# Patient Record
Sex: FEMALE | Race: WHITE | NOT HISPANIC OR LATINO | Employment: OTHER | URBAN - METROPOLITAN AREA
[De-identification: names, ages, dates, MRNs, and addresses within clinical notes are randomized per-mention and may not be internally consistent; named-entity substitution may affect disease eponyms.]

---

## 2017-01-25 ENCOUNTER — APPOINTMENT (OUTPATIENT)
Dept: LAB | Facility: MEDICAL CENTER | Age: 82
End: 2017-01-25
Payer: MEDICARE

## 2017-01-25 ENCOUNTER — TRANSCRIBE ORDERS (OUTPATIENT)
Dept: ADMINISTRATIVE | Facility: HOSPITAL | Age: 82
End: 2017-01-25

## 2017-01-25 DIAGNOSIS — E11.9 TYPE 2 DIABETES MELLITUS WITHOUT COMPLICATIONS (HCC): ICD-10-CM

## 2017-01-25 LAB
ANION GAP SERPL CALCULATED.3IONS-SCNC: 8 MMOL/L (ref 4–13)
BASOPHILS # BLD AUTO: 0.05 THOUSANDS/ΜL (ref 0–0.1)
BASOPHILS NFR BLD AUTO: 1 % (ref 0–1)
BUN SERPL-MCNC: 16 MG/DL (ref 5–25)
CALCIUM SERPL-MCNC: 9.5 MG/DL (ref 8.3–10.1)
CHLORIDE SERPL-SCNC: 103 MMOL/L (ref 100–108)
CO2 SERPL-SCNC: 30 MMOL/L (ref 21–32)
CREAT SERPL-MCNC: 0.74 MG/DL (ref 0.6–1.3)
EOSINOPHIL # BLD AUTO: 0.12 THOUSAND/ΜL (ref 0–0.61)
EOSINOPHIL NFR BLD AUTO: 2 % (ref 0–6)
ERYTHROCYTE [DISTWIDTH] IN BLOOD BY AUTOMATED COUNT: 13.4 % (ref 11.6–15.1)
EST. AVERAGE GLUCOSE BLD GHB EST-MCNC: 200 MG/DL
GFR SERPL CREATININE-BSD FRML MDRD: >60 ML/MIN/1.73SQ M
GLUCOSE SERPL-MCNC: 226 MG/DL (ref 65–140)
HBA1C MFR BLD: 8.6 % (ref 4.2–6.3)
HCT VFR BLD AUTO: 43.8 % (ref 34.8–46.1)
HGB BLD-MCNC: 14.6 G/DL (ref 11.5–15.4)
LYMPHOCYTES # BLD AUTO: 1.71 THOUSANDS/ΜL (ref 0.6–4.47)
LYMPHOCYTES NFR BLD AUTO: 22 % (ref 14–44)
MCH RBC QN AUTO: 30.7 PG (ref 26.8–34.3)
MCHC RBC AUTO-ENTMCNC: 33.3 G/DL (ref 31.4–37.4)
MCV RBC AUTO: 92 FL (ref 82–98)
MONOCYTES # BLD AUTO: 0.79 THOUSAND/ΜL (ref 0.17–1.22)
MONOCYTES NFR BLD AUTO: 10 % (ref 4–12)
NEUTROPHILS # BLD AUTO: 5.01 THOUSANDS/ΜL (ref 1.85–7.62)
NEUTS SEG NFR BLD AUTO: 65 % (ref 43–75)
NRBC BLD AUTO-RTO: 0 /100 WBCS
PLATELET # BLD AUTO: 226 THOUSANDS/UL (ref 149–390)
PMV BLD AUTO: 11.5 FL (ref 8.9–12.7)
POTASSIUM SERPL-SCNC: 3.8 MMOL/L (ref 3.5–5.3)
RBC # BLD AUTO: 4.75 MILLION/UL (ref 3.81–5.12)
SODIUM SERPL-SCNC: 141 MMOL/L (ref 136–145)
TSH SERPL DL<=0.05 MIU/L-ACNC: 1.5 UIU/ML (ref 0.36–3.74)
WBC # BLD AUTO: 7.72 THOUSAND/UL (ref 4.31–10.16)

## 2017-01-25 PROCEDURE — 36415 COLL VENOUS BLD VENIPUNCTURE: CPT

## 2017-01-25 PROCEDURE — 84443 ASSAY THYROID STIM HORMONE: CPT

## 2017-01-25 PROCEDURE — 83036 HEMOGLOBIN GLYCOSYLATED A1C: CPT

## 2017-01-25 PROCEDURE — 80048 BASIC METABOLIC PNL TOTAL CA: CPT

## 2017-01-25 PROCEDURE — 85025 COMPLETE CBC W/AUTO DIFF WBC: CPT

## 2017-02-22 ENCOUNTER — ALLSCRIPTS OFFICE VISIT (OUTPATIENT)
Dept: OTHER | Facility: OTHER | Age: 82
End: 2017-02-22

## 2017-04-04 ENCOUNTER — ALLSCRIPTS OFFICE VISIT (OUTPATIENT)
Dept: OTHER | Facility: OTHER | Age: 82
End: 2017-04-04

## 2017-04-04 ENCOUNTER — HOSPITAL ENCOUNTER (OUTPATIENT)
Dept: RADIOLOGY | Facility: CLINIC | Age: 82
Discharge: HOME | End: 2017-04-04
Payer: MEDICARE

## 2017-04-04 DIAGNOSIS — M25.512 PAIN IN LEFT SHOULDER: ICD-10-CM

## 2017-04-04 PROCEDURE — 73030 X-RAY EXAM OF SHOULDER: CPT

## 2017-06-21 DIAGNOSIS — E11.9 TYPE 2 DIABETES MELLITUS WITHOUT COMPLICATIONS (HCC): ICD-10-CM

## 2017-06-21 DIAGNOSIS — Z79.899 OTHER LONG TERM (CURRENT) DRUG THERAPY: ICD-10-CM

## 2017-06-23 ENCOUNTER — GENERIC CONVERSION - ENCOUNTER (OUTPATIENT)
Dept: OTHER | Facility: OTHER | Age: 82
End: 2017-06-23

## 2017-07-28 ENCOUNTER — GENERIC CONVERSION - ENCOUNTER (OUTPATIENT)
Dept: OTHER | Facility: OTHER | Age: 82
End: 2017-07-28

## 2017-08-07 ENCOUNTER — APPOINTMENT (OUTPATIENT)
Dept: LAB | Facility: MEDICAL CENTER | Age: 82
End: 2017-08-07
Payer: MEDICARE

## 2017-08-07 ENCOUNTER — TRANSCRIBE ORDERS (OUTPATIENT)
Dept: ADMINISTRATIVE | Facility: HOSPITAL | Age: 82
End: 2017-08-07

## 2017-08-07 DIAGNOSIS — Z79.899 OTHER LONG TERM (CURRENT) DRUG THERAPY: ICD-10-CM

## 2017-08-07 DIAGNOSIS — E04.2 NONTOXIC MULTINODULAR GOITER: ICD-10-CM

## 2017-08-07 DIAGNOSIS — E11.9 TYPE 2 DIABETES MELLITUS WITHOUT COMPLICATIONS (HCC): ICD-10-CM

## 2017-08-07 DIAGNOSIS — E04.2 NONTOXIC MULTINODULAR GOITER: Primary | ICD-10-CM

## 2017-08-07 LAB
25(OH)D3 SERPL-MCNC: 36.8 NG/ML (ref 30–100)
ALBUMIN SERPL BCP-MCNC: 3.7 G/DL (ref 3.5–5)
ALP SERPL-CCNC: 62 U/L (ref 46–116)
ALT SERPL W P-5'-P-CCNC: 27 U/L (ref 12–78)
ANION GAP SERPL CALCULATED.3IONS-SCNC: 9 MMOL/L (ref 4–13)
AST SERPL W P-5'-P-CCNC: 16 U/L (ref 5–45)
BACTERIA UR QL AUTO: ABNORMAL /HPF
BASOPHILS # BLD AUTO: 0.04 THOUSANDS/ΜL (ref 0–0.1)
BASOPHILS NFR BLD AUTO: 1 % (ref 0–1)
BILIRUB SERPL-MCNC: 0.57 MG/DL (ref 0.2–1)
BILIRUB UR QL STRIP: NEGATIVE
BUN SERPL-MCNC: 18 MG/DL (ref 5–25)
CALCIUM SERPL-MCNC: 9.4 MG/DL (ref 8.3–10.1)
CHLORIDE SERPL-SCNC: 103 MMOL/L (ref 100–108)
CHOLEST SERPL-MCNC: 140 MG/DL (ref 50–200)
CLARITY UR: ABNORMAL
CO2 SERPL-SCNC: 27 MMOL/L (ref 21–32)
COLOR UR: YELLOW
CREAT SERPL-MCNC: 0.72 MG/DL (ref 0.6–1.3)
CREAT UR-MCNC: 51.6 MG/DL
EOSINOPHIL # BLD AUTO: 0.13 THOUSAND/ΜL (ref 0–0.61)
EOSINOPHIL NFR BLD AUTO: 2 % (ref 0–6)
ERYTHROCYTE [DISTWIDTH] IN BLOOD BY AUTOMATED COUNT: 13.1 % (ref 11.6–15.1)
EST. AVERAGE GLUCOSE BLD GHB EST-MCNC: 217 MG/DL
GFR SERPL CREATININE-BSD FRML MDRD: 75 ML/MIN/1.73SQ M
GLUCOSE P FAST SERPL-MCNC: 249 MG/DL (ref 65–99)
GLUCOSE UR STRIP-MCNC: ABNORMAL MG/DL
HBA1C MFR BLD: 9.2 % (ref 4.2–6.3)
HCT VFR BLD AUTO: 44.6 % (ref 34.8–46.1)
HDLC SERPL-MCNC: 44 MG/DL (ref 40–60)
HGB BLD-MCNC: 14.7 G/DL (ref 11.5–15.4)
HGB UR QL STRIP.AUTO: NEGATIVE
KETONES UR STRIP-MCNC: NEGATIVE MG/DL
LDLC SERPL CALC-MCNC: 56 MG/DL (ref 0–100)
LEUKOCYTE ESTERASE UR QL STRIP: NEGATIVE
LYMPHOCYTES # BLD AUTO: 1.58 THOUSANDS/ΜL (ref 0.6–4.47)
LYMPHOCYTES NFR BLD AUTO: 20 % (ref 14–44)
MCH RBC QN AUTO: 30.2 PG (ref 26.8–34.3)
MCHC RBC AUTO-ENTMCNC: 33 G/DL (ref 31.4–37.4)
MCV RBC AUTO: 92 FL (ref 82–98)
MICROALBUMIN UR-MCNC: 19.9 MG/L (ref 0–20)
MICROALBUMIN/CREAT 24H UR: 39 MG/G CREATININE (ref 0–30)
MONOCYTES # BLD AUTO: 0.67 THOUSAND/ΜL (ref 0.17–1.22)
MONOCYTES NFR BLD AUTO: 8 % (ref 4–12)
NEUTROPHILS # BLD AUTO: 5.55 THOUSANDS/ΜL (ref 1.85–7.62)
NEUTS SEG NFR BLD AUTO: 69 % (ref 43–75)
NITRITE UR QL STRIP: NEGATIVE
NON-SQ EPI CELLS URNS QL MICRO: ABNORMAL /HPF
NRBC BLD AUTO-RTO: 0 /100 WBCS
PH UR STRIP.AUTO: 6 [PH] (ref 4.5–8)
PLATELET # BLD AUTO: 203 THOUSANDS/UL (ref 149–390)
PMV BLD AUTO: 11.4 FL (ref 8.9–12.7)
POTASSIUM SERPL-SCNC: 3.6 MMOL/L (ref 3.5–5.3)
PROT SERPL-MCNC: 7.1 G/DL (ref 6.4–8.2)
PROT UR STRIP-MCNC: NEGATIVE MG/DL
RBC # BLD AUTO: 4.86 MILLION/UL (ref 3.81–5.12)
RBC #/AREA URNS AUTO: ABNORMAL /HPF
SODIUM SERPL-SCNC: 139 MMOL/L (ref 136–145)
SP GR UR STRIP.AUTO: 1.02 (ref 1–1.03)
TRIGL SERPL-MCNC: 199 MG/DL
TSH SERPL DL<=0.05 MIU/L-ACNC: 0.96 UIU/ML (ref 0.36–3.74)
UROBILINOGEN UR QL STRIP.AUTO: 0.2 E.U./DL
WBC # BLD AUTO: 8 THOUSAND/UL (ref 4.31–10.16)
WBC #/AREA URNS AUTO: ABNORMAL /HPF

## 2017-08-07 PROCEDURE — 80061 LIPID PANEL: CPT

## 2017-08-07 PROCEDURE — 36415 COLL VENOUS BLD VENIPUNCTURE: CPT

## 2017-08-07 PROCEDURE — 80053 COMPREHEN METABOLIC PANEL: CPT

## 2017-08-07 PROCEDURE — 82043 UR ALBUMIN QUANTITATIVE: CPT

## 2017-08-07 PROCEDURE — 84443 ASSAY THYROID STIM HORMONE: CPT

## 2017-08-07 PROCEDURE — 85025 COMPLETE CBC W/AUTO DIFF WBC: CPT

## 2017-08-07 PROCEDURE — 81001 URINALYSIS AUTO W/SCOPE: CPT

## 2017-08-07 PROCEDURE — 83036 HEMOGLOBIN GLYCOSYLATED A1C: CPT

## 2017-08-07 PROCEDURE — 82306 VITAMIN D 25 HYDROXY: CPT

## 2017-08-07 PROCEDURE — 82570 ASSAY OF URINE CREATININE: CPT

## 2017-08-22 ENCOUNTER — ALLSCRIPTS OFFICE VISIT (OUTPATIENT)
Dept: OTHER | Facility: OTHER | Age: 82
End: 2017-08-22

## 2017-08-22 DIAGNOSIS — E11.9 TYPE 2 DIABETES MELLITUS WITHOUT COMPLICATIONS (HCC): ICD-10-CM

## 2017-09-01 ENCOUNTER — GENERIC CONVERSION - ENCOUNTER (OUTPATIENT)
Dept: OTHER | Facility: OTHER | Age: 82
End: 2017-09-01

## 2017-10-17 ENCOUNTER — GENERIC CONVERSION - ENCOUNTER (OUTPATIENT)
Dept: OTHER | Facility: OTHER | Age: 82
End: 2017-10-17

## 2017-10-18 ENCOUNTER — GENERIC CONVERSION - ENCOUNTER (OUTPATIENT)
Dept: OTHER | Facility: OTHER | Age: 82
End: 2017-10-18

## 2017-11-07 ENCOUNTER — GENERIC CONVERSION - ENCOUNTER (OUTPATIENT)
Dept: INTERNAL MEDICINE CLINIC | Facility: CLINIC | Age: 82
End: 2017-11-07

## 2017-11-09 ENCOUNTER — ALLSCRIPTS OFFICE VISIT (OUTPATIENT)
Dept: OTHER | Facility: OTHER | Age: 82
End: 2017-11-09

## 2017-11-12 NOTE — PROGRESS NOTES
Assessment    1  Preoperative clearance (V72 84) (Z01 818)   2  Cataract of left eye (366 9) (H26 9)   3  DMII (diabetes mellitus, type 2) (250 00) (E11 9)   4  Benign essential hypertension (401 1) (I10)   5  Hyperlipidemia (272 4) (E78 5)   6  Lacunar infarction (434 91) (I63 9)    Discussion/Summary  Surgical Clearance: She is at a LOW risk from a cardiovascular standpoint at this time without any additional cardiac testing  Reevaluation needed, if she should present with symptoms prior to surgery/procedure  Surgical clearance faxed to       Patient would hold her metformin the day of surgery and would take only 10 units of Lantus rather than 40 units the night before  She will continue with Plavix as patient has history of lacunar infarct  Blood pressure is well controlled  The patient was counseled regarding instructions for management,-- risk factor reductions,-- patient and family education,-- impressions,-- risks and benefits of treatment options,-- importance of compliance with treatment  Chief Complaint  pre op      History of Present Illness  Pre-Op Visit (Brief): The patient is being seen for a preoperative visit-- and-- cataract  Surgical Risk Assessment: Exercise Capacity: unable to walk two flights of stairs without symptoms  Lifestyle Factors: denies alcohol use, denies tobacco use and denies illegal drug use  Symptoms: no frequent nosebleeds,-- no chest pain,-- no cough,-- no dyspnea,-- no edema-- and-- no palpitations  Pertinent Family History: no pertinent family history  Living Situation: no post-op concerns with her living situation  Review of Systems   Constitutional: no fever-- and-- no chills  Eyes: eyesight problems  ENT: no sore throat  Cardiovascular: no chest pain,-- no palpitations-- and-- no lower extremity edema  Respiratory: shortness of breath during exertion, but-- no cough  Gastrointestinal: no abdominal pain,-- no nausea-- and-- no diarrhea    Genitourinary: no dysuria  Endocrine: 130-140  Active Problems    1  Benign essential hypertension (401 1) (I10)   2  Benign paroxysmal positional vertigo (386 11) (H81 10)   3  DMII (diabetes mellitus, type 2) (250 00) (E11 9)   4  Fatigue (780 79) (R53 83)   5  Flu vaccine need (V04 81) (Z23)   6  Hyperlipidemia (272 4) (E78 5)   7  Hypokalemia (276 8) (E87 6)   8  Left shoulder pain (719 41) (M25 512)   9  Long term use of drug (V58 69) (Z79 899)   10  Meningioma (225 2) (D32 9)   11  Osteoporosis (733 00) (M81 0)   12  Pain in joint of left shoulder (719 41) (M25 512)   13  Vitamin D deficiency (268 9) (E55 9)    Past Medical History   · Need for prophylactic vaccination and inoculation against influenza (V04 81) (Z23)   · Need for vaccination with 13-polyvalent pneumococcal conjugate vaccine (V03 82) (Z23)   · Screening for other and unspecified genitourinary condition (V81 6) (Z13 89)   · Visit for screening mammogram (V76 12) (Z12 31)    The active problems and past medical history were reviewed and updated today  Surgical History   · History of Appendectomy   · History of Hysterectomy   · History of Hysterectomy   · History of Near-Total Thyroidectomy   · History of Tonsillectomy With Adenoidectomy   · History of Tonsillectomy With Adenoidectomy    Family History  Mother    · Family history of Diabetes  Family History    · Family history of Cancer   · Family history of Diabetes Mellitus (V18 0)   · Family history of Father  At Age 66   · Family history of Hypertension (V17 49)   · Family history of Mother  At Age [de-identified]    The family history was reviewed and updated today  Social History     · Denied: History of Alcohol Use (History)   · Never a smoker  The social history was reviewed and updated today  Current Meds   1  AmLODIPine Besylate 5 MG Oral Tablet; Take 1 tablet daily; Therapy: 94QSR8048 to (Last Rx:2017)  Requested for: 45ADA8997 Ordered   2   Eder Next Test In Vitro Strip; TEST TWICE DAILY; Therapy: 34Oee7338 to (Evaluate:08Apr2013)  Requested for: 28BXE8111; Last Rx:09Nov2012 Ordered   3  BD Insulin Syr Ultrafine II 31G X 5/16 0 5 ML Miscellaneous; USE AS DIRECTED; Therapy: 38IUF3493 to (Last Rx:26Fht6083) Ordered   4  Clopidogrel Bisulfate 75 MG Oral Tablet; Take 1 tablet daily; Therapy: 04OUT8846 to (Last Rx:89Cwj2790)  Requested for: 33Uqe7934 Ordered   5  Lantus 100 UNIT/ML Subcutaneous Solution; INJECT UNDER THE SKIN AS DIRECTED; Therapy: 87QYY4236 to (Last Rx:06Bha1921)  Requested for: 88Qru0997 Ordered   6  Losartan Potassium-HCTZ 50-12 5 MG Oral Tablet; TAKE ONE AND ONE-HALF TABLETS DAILY; Therapy: 76Ylz2091 to (Last Rx:71Yvv3732)  Requested for: 14Zal5445 Ordered   7  MetFORMIN HCl - 1000 MG Oral Tablet; take 1 tablet daily with food; Therapy: 25HQH1088 to (Last Rx:21Iyo6631)  Requested for: 00Tar6289 Ordered   8  Simvastatin 20 MG Oral Tablet; TAKE 1 TABLET AT BEDTIME; Therapy: 92XCF9426 to (Last Rx:96Lqs3877)  Requested for: 79Kgx6375 Ordered    The medication list was reviewed and updated today  Allergies  1  No Known Drug Allergies    Vitals   Recorded: 64CCU1911 09:37AM Recorded: 26IGN2154 09:35AM   Temperature 96 5 F    Heart Rate  88   Systolic  672   Diastolic  74   Height  6 ft 10 in   Weight  153 lb    BMI Calculated  16   BSA Calculated  2 09   O2 Saturation  93       Physical Exam   Eyes  Conjunctiva and lids: No swelling, erythema or discharge  Ears, Nose, Mouth, and Throat  Hearing: Normal    Oropharynx: Normal with no erythema, edema, exudate or lesions  Neck  Neck: Supple, symmetric, trachea midline, no masses  Thyroid: Normal, no thyromegaly  Pulmonary  Respiratory effort: No increased work of breathing or signs of respiratory distress  Auscultation of lungs: Clear to auscultation  Cardiovascular  Auscultation of heart: Normal rate and rhythm, normal S1 and S2, no murmurs  Carotid pulses: 2+ bilaterally     Pedal pulses: 2+ bilaterally  Examination of extremities for edema and/or varicosities: Normal    Abdomen  Abdomen: Non-tender, no masses  Liver and spleen: No hepatomegaly or splenomegaly  Neurologic  Cranial nerves: Cranial nerves II-XII intact  Cortical function: Normal mental status  Psychiatric  Judgment and insight: Normal    Orientation to person, place, and time: Normal    Recent and remote memory: Intact  Mood and affect: Normal        End of Encounter Meds    1  AmLODIPine Besylate 5 MG Oral Tablet; Take 1 tablet daily; Therapy: 57JGB0125 to (Last Rx:09Oct2017)  Requested for: 60PCF9986 Ordered   2  Losartan Potassium-HCTZ 50-12 5 MG Oral Tablet; TAKE ONE AND ONE-HALF TABLETS DAILY; Therapy: 40Vws0871 to (Last Rx:18Ltc5915)  Requested for: 89Azy5546 Ordered    3  Clopidogrel Bisulfate 75 MG Oral Tablet (Plavix); Take 1 tablet daily; Therapy: 78BAJ6062 to (Last Rx:12Rkq6691)  Requested for: 59Cpe6334 Ordered    4  Марина Contour Next Test In [employer]; TEST TWICE DAILY; Therapy: 43Vgl9441 to (Evaluate:08Apr2013)  Requested for: 35KSW5332; Last Rx:09Nov2012 Ordered   5  BD Insulin Syr Ultrafine II 31G X 5/16 0 5 ML Miscellaneous; USE AS DIRECTED; Therapy: 50RBG9672 to (Last Rx:64Jdz5884) Ordered   6  Lantus 100 UNIT/ML Subcutaneous Solution; INJECT UNDER THE SKIN AS DIRECTED; Therapy: 03WSX7412 to (Last Rx:12Zaz8078)  Requested for: 80Eur0095 Ordered   7  MetFORMIN HCl - 1000 MG Oral Tablet (Glucophage); take 1 tablet daily with food; Therapy: 88HTF0762 to (Last Rx:26Oct2017)  Requested for: 26Oct2017 Ordered    8  Simvastatin 20 MG Oral Tablet; TAKE 1 TABLET AT BEDTIME; Therapy: 38ECM2042 to (Last Rx:13Jun2017)  Requested for: 48UUY9882 Ordered    Future Appointments    Date/Time Provider Specialty Site   12/27/2017 09:00 AM GISELLA Vickers   Internal Medicine [facility]       Signatures   Electronically signed by : GISELLA De Jesus ; Nov 11 2017  9:46PM EST                       (Author)

## 2018-01-13 VITALS
HEIGHT: 72 IN | WEIGHT: 166 LBS | HEART RATE: 75 BPM | BODY MASS INDEX: 22.48 KG/M2 | OXYGEN SATURATION: 98 % | SYSTOLIC BLOOD PRESSURE: 148 MMHG | DIASTOLIC BLOOD PRESSURE: 86 MMHG

## 2018-01-13 VITALS
BODY MASS INDEX: 30.73 KG/M2 | HEIGHT: 62 IN | DIASTOLIC BLOOD PRESSURE: 79 MMHG | HEART RATE: 97 BPM | WEIGHT: 167 LBS | SYSTOLIC BLOOD PRESSURE: 164 MMHG

## 2018-01-14 VITALS — OXYGEN SATURATION: 97 % | DIASTOLIC BLOOD PRESSURE: 80 MMHG | HEART RATE: 101 BPM | SYSTOLIC BLOOD PRESSURE: 140 MMHG

## 2018-01-14 VITALS
DIASTOLIC BLOOD PRESSURE: 74 MMHG | SYSTOLIC BLOOD PRESSURE: 128 MMHG | WEIGHT: 153 LBS | HEART RATE: 88 BPM | OXYGEN SATURATION: 93 % | HEIGHT: 72 IN | TEMPERATURE: 96.5 F | BODY MASS INDEX: 20.72 KG/M2

## 2018-01-17 NOTE — PROGRESS NOTES
Assessment    1  Benign essential hypertension (401 1) (I10)   2  DMII (diabetes mellitus, type 2) (250 00) (E11 9)   3  Encounter for preventive health examination (V70 0) (Z00 00)    Discussion/Summary  Discussion Summary:   Nikki Adams is animated and telogen and in no distress her blood pressures well controlled the carotids are normal lungs are clear cardiac examination feels a regular rhythm and no murmurs or gallops extremities unremarkable is a well-healed surgical scar at the base of the neck from previous thyroid surgery  The patient stable  She will continue with Norvasc 5, losartan HCTZ 80 gas 12-1/2, Plavix 75, 35 units of Lantus, 20 of simvastatin, and 1000 and of metformin  Her last A1c was 8 5 and I think under the circumstances this is probably acceptable eared no changes are made we cost counseled her at length about her 's prognosis we'll see her in 4 months  Chief Complaint  Chief Complaint Free Text Note Form: 4 month f/u      History of Present Illness  HPI: Tasha Saha is here today for a visit  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  Her  Robert Cantu apparently collapsed at home was taken to the hospital is now in [place] his dementia appears to have progressed rapidly he is unable to swallow is febrile and probably aspirated  Arvilla Done he otherwise is holding her own he eats sometimes a moderately this is certainly defensible  She continues to see Dr Orly Blanchard her foot Dr  and also Dr Blank Seen her ophthalmologist she takes her medications correctly there no other new issues  She is no longer doing braille      Active Problems    1  Benign essential hypertension (401 1) (I10)   2  Benign paroxysmal positional vertigo (386 11) (H81 10)   3  DMII (diabetes mellitus, type 2) (250 00) (E11 9)   4  Fatigue (780 79) (R53 83)   5  Flu vaccine need (V04 81) (Z23)   6  Hyperlipidemia (272 4) (E78 5)   7  Hypokalemia (276 8) (E87 6)   8  Long term use of drug (V58 69) (Z79 899)   9   Meningioma (225 2) (D32 9)   10  Osteoporosis (733 00) (M81 0)   11  Vitamin D deficiency (268 9) (E55 9)    Past Medical History    1  Need for prophylactic vaccination and inoculation against influenza (V04 81) (Z23)   2  Visit for screening mammogram (V7 12) (Z12 31)    Surgical History    1  History of Appendectomy   2  History of Hysterectomy   3  History of Hysterectomy   4  History of Near-Total Thyroidectomy   5  History of Tonsillectomy With Adenoidectomy   6  History of Tonsillectomy With Adenoidectomy    Family History    1  Family history of Diabetes    2  Family history of Cancer   3  Family history of Diabetes Mellitus (V18 0)   4  Family history of Father  At Age 67   9  Family history of Hypertension (V17 49)   6  Family history of Mother  At Age [de-identified]    Social History    · Denied: History of Alcohol Use (History)   · Never A Smoker    Current Meds   1  AmLODIPine Besylate 5 MG Oral Tablet; Take 1 tablet daily; Therapy: 56PTP0880 to (Last Rx:2016)  Requested for: 64ULU5374 Ordered   2  Марина Contour Next Test In [employer]; TEST TWICE DAILY; Therapy: 26Qtz2772 to (Evaluate:2013)  Requested for: 14CIV4346; Last Rx:2012   Ordered   3  BD Insulin Syr Ultrafine II 31G X 5/16" 0 5 ML Miscellaneous; USE AS DIRECTED; Therapy: 84FIT4434 to (Last Rx:45Zkq8648) Ordered   4  Clopidogrel Bisulfate 75 MG Oral Tablet; Take 1 tablet daily; Therapy: 40TUA9940 to (Last Rx:2016)  Requested for: 77QGJ1957 Ordered   5  Lantus 100 UNIT/ML Subcutaneous Solution; INJECT UNDER THE SKIN AS DIRECTED; Therapy: 28GKR7299 to (Last Rx:39Opc9046)  Requested for: 95Vmw9875 Ordered   6  Losartan Potassium-HCTZ 50-12 5 MG Oral Tablet; TAKE ONE AND ONE-HALF TABLETS DAILY; Therapy: 2011 to (Last Rx:2015)  Requested for: 07KMW8569 Ordered   7  MetFORMIN HCl - 1000 MG Oral Tablet; take 1 tablet daily with food; Therapy: 30TPD9867 to (Last Rx:2016)  Requested for: 50IXK8423 Ordered   8  Simvastatin 20 MG Oral Tablet; TAKE 1 TABLET AT BEDTIME; Therapy: 96GVW5955 to (Last Rx:12Oct2015)  Requested for: 12Oct2015 Ordered    Allergies    1  No Known Drug Allergies    Vitals  Vital Signs [Data Includes: Current Encounter]    Recorded: 21Mar2016 10:36AM Recorded: 21Mar2016 10:20AM   Heart Rate  92   Pulse Quality Normal    Systolic 883    Diastolic 80    Height  5 ft 2 in   Weight  164 lb    BMI Calculated  30   BSA Calculated  1 76   O2 Saturation  98     Physical Exam    Constitutional   General appearance: No acute distress, well appearing and well nourished  Eyes   Conjunctiva and lids: No swelling, erythema or discharge  Pulmonary   Respiratory effort: No increased work of breathing or signs of respiratory distress  Auscultation of lungs: Clear to auscultation  Cardiovascular   Auscultation of heart: Normal rate and rhythm, normal S1 and S2, without murmurs  Examination of extremities for edema and/or varicosities: Normal     Carotid pulses: Normal     Skin   Skin and subcutaneous tissue: Normal without rashes or lesions      Psychiatric   Orientation to person, place, and time: Normal     Mood and affect: Normal          Signatures   Electronically signed by : ROBIN Sifuentes MD; Mar 21 2016 10:39AM EST                       (Author)

## 2018-01-23 NOTE — CONSULTS
Chief Complaint  pre op      History of Present Illness  Pre-Op Visit (Brief): The patient is being seen for a preoperative visit and cataract  Surgical Risk Assessment: Exercise Capacity: unable to walk two flights of stairs without symptoms  Lifestyle Factors: denies alcohol use, denies tobacco use and denies illegal drug use  Symptoms: no frequent nosebleeds, no chest pain, no cough, no dyspnea, no edema and no palpitations  Pertinent Family History: no pertinent family history  Living Situation: no post-op concerns with her living situation  Review of Systems    Constitutional: no fever and no chills  Eyes: eyesight problems  ENT: no sore throat  Cardiovascular: no chest pain, no palpitations and no lower extremity edema  Respiratory: shortness of breath during exertion, but no cough  Gastrointestinal: no abdominal pain, no nausea and no diarrhea  Genitourinary: no dysuria  Endocrine: 130-140  Active Problems    1  Benign essential hypertension (401 1) (I10)   2  Benign paroxysmal positional vertigo (386 11) (H81 10)   3  DMII (diabetes mellitus, type 2) (250 00) (E11 9)   4  Fatigue (780 79) (R53 83)   5  Flu vaccine need (V04 81) (Z23)   6  Hyperlipidemia (272 4) (E78 5)   7  Hypokalemia (276 8) (E87 6)   8  Left shoulder pain (719 41) (M25 512)   9  Long term use of drug (V58 69) (Z79 899)   10  Meningioma (225 2) (D32 9)   11  Osteoporosis (733 00) (M81 0)   12  Pain in joint of left shoulder (719 41) (M25 512)   13  Vitamin D deficiency (268 9) (E55 9)    Past Medical History    · Need for prophylactic vaccination and inoculation against influenza (V04 81) (Z23)   · Need for vaccination with 13-polyvalent pneumococcal conjugate vaccine (V03 82) (Z23)   · Screening for other and unspecified genitourinary condition (V81 6) (Z13 89)   · Visit for screening mammogram (V76 12) (Z12 31)    The active problems and past medical history were reviewed and updated today        Surgical History    · History of Appendectomy   · History of Hysterectomy   · History of Hysterectomy   · History of Near-Total Thyroidectomy   · History of Tonsillectomy With Adenoidectomy   · History of Tonsillectomy With Adenoidectomy    Family History    · Family history of Diabetes    · Family history of Cancer   · Family history of Diabetes Mellitus (V18 0)   · Family history of Father  At Age 66   · Family history of Hypertension (V17 49)   · Family history of Mother  At Age [de-identified]    The family history was reviewed and updated today  Social History    · Denied: History of Alcohol Use (History)   · Never a smoker  The social history was reviewed and updated today  Current Meds   1  AmLODIPine Besylate 5 MG Oral Tablet; Take 1 tablet daily; Therapy: 54NLY5045 to (Last Rx:55Qcv2934)  Requested for: 24KGT2782 Ordered   2  Марина Contour Next Test In [employer]; TEST TWICE DAILY; Therapy: 85Gmz6943 to (Evaluate:2013)  Requested for: 46HSU5150; Last   Rx:2012 Ordered   3  BD Insulin Syr Ultrafine II 31G X 5/16" 0 5 ML Miscellaneous; USE AS DIRECTED; Therapy: 72LTK2583 to (Last Rx:84Ioa4946) Ordered   4  Clopidogrel Bisulfate 75 MG Oral Tablet; Take 1 tablet daily; Therapy: 10RWA9703 to (Last Rx:70Uwa6149)  Requested for: 95Upd0887 Ordered   5  Lantus 100 UNIT/ML Subcutaneous Solution; INJECT UNDER THE SKIN AS DIRECTED; Therapy: 61WXG1045 to (Last Rx:18Hen8686)  Requested for: 51Fim2834 Ordered   6  Losartan Potassium-HCTZ 50-12 5 MG Oral Tablet; TAKE ONE AND ONE-HALF   TABLETS DAILY; Therapy: 07Emg9848 to (Last Rx:89Pea4766)  Requested for: 00Crk5149 Ordered   7  MetFORMIN HCl - 1000 MG Oral Tablet; take 1 tablet daily with food; Therapy: 11ZCZ3134 to (Last Rx:59Hvx9300)  Requested for: 2017 Ordered   8  Simvastatin 20 MG Oral Tablet; TAKE 1 TABLET AT BEDTIME;    Therapy: 10SSU2996 to (Last Rx:89Kja2395)  Requested for: 03Xwc4560 Ordered    The medication list was reviewed and updated today  Allergies    1  No Known Drug Allergies    Vitals  Signs    Temperature: 96 5 F   Heart Rate: 88  Systolic: 223  Diastolic: 74  Height: 6 ft 10 in  Weight: 153 lb   BMI Calculated: 16  BSA Calculated: 2 09  O2 Saturation: 93    Physical Exam    Eyes   Conjunctiva and lids: No swelling, erythema or discharge  Ears, Nose, Mouth, and Throat   Hearing: Normal     Oropharynx: Normal with no erythema, edema, exudate or lesions  Neck   Neck: Supple, symmetric, trachea midline, no masses  Thyroid: Normal, no thyromegaly  Pulmonary   Respiratory effort: No increased work of breathing or signs of respiratory distress  Auscultation of lungs: Clear to auscultation  Cardiovascular   Auscultation of heart: Normal rate and rhythm, normal S1 and S2, no murmurs  Carotid pulses: 2+ bilaterally  Pedal pulses: 2+ bilaterally  Examination of extremities for edema and/or varicosities: Normal     Abdomen   Abdomen: Non-tender, no masses  Liver and spleen: No hepatomegaly or splenomegaly  Neurologic   Cranial nerves: Cranial nerves II-XII intact  Cortical function: Normal mental status  Psychiatric   Judgment and insight: Normal     Orientation to person, place, and time: Normal     Recent and remote memory: Intact  Mood and affect: Normal        Assessment    1  Preoperative clearance (V72 84) (Z01 818)   2  Cataract of left eye (366 9) (H26 9)   3  DMII (diabetes mellitus, type 2) (250 00) (E11 9)   4  Benign essential hypertension (401 1) (I10)   5  Hyperlipidemia (272 4) (E78 5)   6  Lacunar infarction (434 91) (I63 9)    Discussion/Summary  Surgical Clearance: She is at a LOW risk from a cardiovascular standpoint at this time without any additional cardiac testing  Reevaluation needed, if she should present with symptoms prior to surgery/procedure   Surgical clearance faxed to       Patient would hold her metformin the day of surgery and would take only 10 units of Lantus rather than 40 units the night before  She will continue with Plavix as patient has history of lacunar infarct  Blood pressure is well controlled  The patient was counseled regarding instructions for management, risk factor reductions, patient and family education, impressions, risks and benefits of treatment options, importance of compliance with treatment  End of Encounter Meds    1  AmLODIPine Besylate 5 MG Oral Tablet; Take 1 tablet daily; Therapy: 70DRA4468 to (Last Rx:09Oct2017)  Requested for: 72FWP1936 Ordered   2  Losartan Potassium-HCTZ 50-12 5 MG Oral Tablet; TAKE ONE AND ONE-HALF   TABLETS DAILY; Therapy: 18Csp8252 to (Last Rx:62Zep1619)  Requested for: 16Xzx5117 Ordered    3  Clopidogrel Bisulfate 75 MG Oral Tablet (Plavix); Take 1 tablet daily; Therapy: 07JZU8435 to (Last Rx:80Qny2975)  Requested for: 56Rnv6912 Ordered    4  Марина Contour Next Test In [employer]; TEST TWICE DAILY; Therapy: 50Fjq5573 to (Evaluate:08Apr2013)  Requested for: 29NDA4228; Last   Rx:09Nov2012 Ordered   5  BD Insulin Syr Ultrafine II 31G X 5/16" 0 5 ML Miscellaneous; USE AS DIRECTED; Therapy: 05YQI6346 to (Last Rx:78Rzc8485) Ordered   6  Lantus 100 UNIT/ML Subcutaneous Solution; INJECT UNDER THE SKIN AS DIRECTED; Therapy: 47DMD2305 to (Last Rx:22Enq4957)  Requested for: 56Zkx9254 Ordered   7  MetFORMIN HCl - 1000 MG Oral Tablet (Glucophage); take 1 tablet daily with food; Therapy: 18KND6267 to (Last Rx:26Oct2017)  Requested for: 26Oct2017 Ordered    8  Simvastatin 20 MG Oral Tablet; TAKE 1 TABLET AT BEDTIME;    Therapy: 60FDC3170 to (Last Rx:13Jun2017)  Requested for: 07LGE2374 Ordered    Signatures   Electronically signed by : GISELLA Coe ; Nov 11 2017  9:46PM EST                       (Author)

## 2018-01-24 DIAGNOSIS — E11.8 TYPE 2 DIABETES MELLITUS WITH COMPLICATION, WITHOUT LONG-TERM CURRENT USE OF INSULIN (HCC): Primary | ICD-10-CM

## 2018-02-08 ENCOUNTER — TELEPHONE (OUTPATIENT)
Dept: INTERNAL MEDICINE CLINIC | Facility: CLINIC | Age: 83
End: 2018-02-08

## 2018-02-08 DIAGNOSIS — E11.8 TYPE 2 DIABETES MELLITUS WITH COMPLICATION, WITHOUT LONG-TERM CURRENT USE OF INSULIN (HCC): ICD-10-CM

## 2018-02-12 ENCOUNTER — TELEPHONE (OUTPATIENT)
Dept: INTERNAL MEDICINE CLINIC | Facility: CLINIC | Age: 83
End: 2018-02-12

## 2018-02-12 NOTE — TELEPHONE ENCOUNTER
Pt called stating that [facility] called and told her that she they did not receive the script for her metformin and is requesting that a 2 week supply for her be sent to [facility] on [facility] [place]  She states she only has 6 pills left  Please advise [phone]

## 2018-02-13 RX ORDER — CLOPIDOGREL BISULFATE 75 MG/1
1 TABLET ORAL DAILY
COMMUNITY
Start: 2012-02-17 | End: 2018-03-02 | Stop reason: SDUPTHER

## 2018-02-13 RX ORDER — SIMVASTATIN 20 MG
1 TABLET ORAL
COMMUNITY
Start: 2012-01-27 | End: 2018-05-30 | Stop reason: SDUPTHER

## 2018-02-13 RX ORDER — AMLODIPINE BESYLATE 5 MG/1
1 TABLET ORAL DAILY
COMMUNITY
Start: 2012-03-23 | End: 2018-04-06 | Stop reason: SDUPTHER

## 2018-02-13 RX ORDER — LOSARTAN POTASSIUM AND HYDROCHLOROTHIAZIDE 12.5; 5 MG/1; MG/1
1.5 TABLET ORAL DAILY
COMMUNITY
Start: 2011-04-21 | End: 2018-08-01 | Stop reason: SDUPTHER

## 2018-02-13 RX ORDER — BLOOD SUGAR DIAGNOSTIC
STRIP MISCELLANEOUS
COMMUNITY
Start: 2012-09-28

## 2018-02-13 RX ORDER — INSULIN GLARGINE 100 [IU]/ML
INJECTION, SOLUTION SUBCUTANEOUS
COMMUNITY
Start: 2011-11-24 | End: 2018-05-24 | Stop reason: SDUPTHER

## 2018-02-14 NOTE — TELEPHONE ENCOUNTER
Pt called stating that [facility] did receive the request for her metformin however the script is incomplete and need us to call to give the missing information so they can process it for her  She also states that she requested metformin to be called in to [facility] on [address] just enough to hold her over since she only has 2 pills left  Please advise

## 2018-02-14 NOTE — TELEPHONE ENCOUNTER
PT was rescheduled with DR DOUGHERTY for early April, please order routine  Labs prior to   Will use  Lab

## 2018-03-02 DIAGNOSIS — Z00.00 HEALTH CARE MAINTENANCE: Primary | ICD-10-CM

## 2018-03-02 RX ORDER — CLOPIDOGREL BISULFATE 75 MG/1
75 TABLET ORAL DAILY
Qty: 90 TABLET | Refills: 1 | Status: SHIPPED | OUTPATIENT
Start: 2018-03-02 | End: 2018-09-10 | Stop reason: SDUPTHER

## 2018-03-19 ENCOUNTER — TELEPHONE (OUTPATIENT)
Dept: INTERNAL MEDICINE CLINIC | Facility: CLINIC | Age: 83
End: 2018-03-19

## 2018-03-20 DIAGNOSIS — E55.9 VITAMIN D DEFICIENCY: ICD-10-CM

## 2018-03-20 DIAGNOSIS — E11.9 TYPE 2 DIABETES MELLITUS WITHOUT COMPLICATION, WITHOUT LONG-TERM CURRENT USE OF INSULIN (HCC): Primary | ICD-10-CM

## 2018-03-20 DIAGNOSIS — E78.49 OTHER HYPERLIPIDEMIA: ICD-10-CM

## 2018-03-30 ENCOUNTER — TRANSCRIBE ORDERS (OUTPATIENT)
Dept: ADMINISTRATIVE | Facility: HOSPITAL | Age: 83
End: 2018-03-30

## 2018-03-30 ENCOUNTER — LAB (OUTPATIENT)
Dept: LAB | Facility: MEDICAL CENTER | Age: 83
End: 2018-03-30
Payer: MEDICARE

## 2018-03-30 DIAGNOSIS — E78.49 OTHER HYPERLIPIDEMIA: ICD-10-CM

## 2018-03-30 DIAGNOSIS — E55.9 VITAMIN D DEFICIENCY: ICD-10-CM

## 2018-03-30 DIAGNOSIS — E11.9 TYPE 2 DIABETES MELLITUS WITHOUT COMPLICATION, WITHOUT LONG-TERM CURRENT USE OF INSULIN (HCC): ICD-10-CM

## 2018-03-30 LAB
ALBUMIN SERPL BCP-MCNC: 3.8 G/DL (ref 3.5–5)
ALP SERPL-CCNC: 54 U/L (ref 46–116)
ALT SERPL W P-5'-P-CCNC: 26 U/L (ref 12–78)
ANION GAP SERPL CALCULATED.3IONS-SCNC: 5 MMOL/L (ref 4–13)
AST SERPL W P-5'-P-CCNC: 18 U/L (ref 5–45)
BASOPHILS # BLD AUTO: 0.05 THOUSANDS/ΜL (ref 0–0.1)
BASOPHILS NFR BLD AUTO: 1 % (ref 0–1)
BILIRUB SERPL-MCNC: 0.58 MG/DL (ref 0.2–1)
BUN SERPL-MCNC: 18 MG/DL (ref 5–25)
CALCIUM SERPL-MCNC: 9.7 MG/DL (ref 8.3–10.1)
CHLORIDE SERPL-SCNC: 106 MMOL/L (ref 100–108)
CHOLEST SERPL-MCNC: 131 MG/DL (ref 50–200)
CO2 SERPL-SCNC: 29 MMOL/L (ref 21–32)
CREAT SERPL-MCNC: 0.75 MG/DL (ref 0.6–1.3)
EOSINOPHIL # BLD AUTO: 0.15 THOUSAND/ΜL (ref 0–0.61)
EOSINOPHIL NFR BLD AUTO: 2 % (ref 0–6)
ERYTHROCYTE [DISTWIDTH] IN BLOOD BY AUTOMATED COUNT: 13.6 % (ref 11.6–15.1)
EST. AVERAGE GLUCOSE BLD GHB EST-MCNC: 186 MG/DL
GFR SERPL CREATININE-BSD FRML MDRD: 71 ML/MIN/1.73SQ M
GLUCOSE P FAST SERPL-MCNC: 132 MG/DL (ref 65–99)
HBA1C MFR BLD: 8.1 % (ref 4.2–6.3)
HCT VFR BLD AUTO: 43.8 % (ref 34.8–46.1)
HDLC SERPL-MCNC: 51 MG/DL (ref 40–60)
HGB BLD-MCNC: 14.3 G/DL (ref 11.5–15.4)
LDLC SERPL CALC-MCNC: 54 MG/DL (ref 0–100)
LYMPHOCYTES # BLD AUTO: 1.57 THOUSANDS/ΜL (ref 0.6–4.47)
LYMPHOCYTES NFR BLD AUTO: 21 % (ref 14–44)
MCH RBC QN AUTO: 30.5 PG (ref 26.8–34.3)
MCHC RBC AUTO-ENTMCNC: 32.6 G/DL (ref 31.4–37.4)
MCV RBC AUTO: 93 FL (ref 82–98)
MONOCYTES # BLD AUTO: 0.72 THOUSAND/ΜL (ref 0.17–1.22)
MONOCYTES NFR BLD AUTO: 10 % (ref 4–12)
NEUTROPHILS # BLD AUTO: 5.05 THOUSANDS/ΜL (ref 1.85–7.62)
NEUTS SEG NFR BLD AUTO: 66 % (ref 43–75)
NRBC BLD AUTO-RTO: 0 /100 WBCS
PLATELET # BLD AUTO: 215 THOUSANDS/UL (ref 149–390)
PMV BLD AUTO: 11.5 FL (ref 8.9–12.7)
POTASSIUM SERPL-SCNC: 3.5 MMOL/L (ref 3.5–5.3)
PROT SERPL-MCNC: 7.2 G/DL (ref 6.4–8.2)
RBC # BLD AUTO: 4.69 MILLION/UL (ref 3.81–5.12)
SODIUM SERPL-SCNC: 140 MMOL/L (ref 136–145)
TRIGL SERPL-MCNC: 130 MG/DL
WBC # BLD AUTO: 7.55 THOUSAND/UL (ref 4.31–10.16)

## 2018-03-30 PROCEDURE — 80061 LIPID PANEL: CPT

## 2018-03-30 PROCEDURE — 83036 HEMOGLOBIN GLYCOSYLATED A1C: CPT

## 2018-03-30 PROCEDURE — 36415 COLL VENOUS BLD VENIPUNCTURE: CPT

## 2018-03-30 PROCEDURE — 85025 COMPLETE CBC W/AUTO DIFF WBC: CPT

## 2018-03-30 PROCEDURE — 80053 COMPREHEN METABOLIC PANEL: CPT

## 2018-04-06 DIAGNOSIS — I10 HYPERTENSION, UNSPECIFIED TYPE: Primary | ICD-10-CM

## 2018-04-06 RX ORDER — AMLODIPINE BESYLATE 5 MG/1
TABLET ORAL
Qty: 90 TABLET | Refills: 0 | Status: SHIPPED | OUTPATIENT
Start: 2018-04-06 | End: 2018-06-15 | Stop reason: SDUPTHER

## 2018-04-17 ENCOUNTER — OFFICE VISIT (OUTPATIENT)
Dept: INTERNAL MEDICINE CLINIC | Facility: CLINIC | Age: 83
End: 2018-04-17
Payer: MEDICARE

## 2018-04-17 VITALS
OXYGEN SATURATION: 96 % | HEIGHT: 62 IN | DIASTOLIC BLOOD PRESSURE: 82 MMHG | WEIGHT: 158.2 LBS | HEART RATE: 82 BPM | SYSTOLIC BLOOD PRESSURE: 148 MMHG | BODY MASS INDEX: 29.11 KG/M2

## 2018-04-17 DIAGNOSIS — Z79.4 TYPE 2 DIABETES MELLITUS WITH COMPLICATION, WITH LONG-TERM CURRENT USE OF INSULIN (HCC): Primary | ICD-10-CM

## 2018-04-17 DIAGNOSIS — E11.8 TYPE 2 DIABETES MELLITUS WITH COMPLICATION, WITH LONG-TERM CURRENT USE OF INSULIN (HCC): Primary | ICD-10-CM

## 2018-04-17 DIAGNOSIS — I10 BENIGN ESSENTIAL HYPERTENSION: ICD-10-CM

## 2018-04-17 DIAGNOSIS — Z00.00 MEDICARE ANNUAL WELLNESS VISIT, SUBSEQUENT: ICD-10-CM

## 2018-04-17 PROCEDURE — G0439 PPPS, SUBSEQ VISIT: HCPCS | Performed by: INTERNAL MEDICINE

## 2018-04-17 PROCEDURE — 99213 OFFICE O/P EST LOW 20 MIN: CPT | Performed by: INTERNAL MEDICINE

## 2018-04-17 NOTE — PROGRESS NOTES
Assessment/Plan:    No problem-specific Assessment & Plan notes found for this encounter  Diagnoses and all orders for this visit:    Type 2 diabetes mellitus with complication, with long-term current use of insulin ([address] )    Benign essential hypertension    Medicare annual wellness visit, subsequent        Subjective:      Patient ID: Richy Mendoza is a 80 y o  female  HPI     Rom Gerber is here today for visit she feels well   Her  John Ward is  she lives alone she is quite spry energetic has a good attitude about life is hoping that she will make a Dorla Pollen birthday in October she no longer drives she has been much more careful about her diabetes and sugars and we did some lab studies and found that her A1c is now 8 1 who previously had been 9 2  She has had a couple of sugars that dropped actually below 100 and she treats this with Candy and subsequently reduces her insulins from 40 units to 35  She has had some intermittent numbness in her hands she was very active doing Emma Rust in the past and using her answer craft and is where the carpal tunnel but does not wish any intervention she sees Dr Shaista Cabrera for nail care  She is under the care of Dr Efrain Burkitt her ophthalmologist and just had her 2nd cataract operated on  Recently she was outside doing some yard work and actually was able to rake up substantial amount of Pine cones and fill a couple of containers full of him she was able to do this comfortably in enjoyable E  The following portions of the patient's history were reviewed and updated as appropriate: past family history, past medical history and past surgical history      Review of Systems    Noncontributory    Objective:      /82 (Patient Position: Sitting, Cuff Size: Standard)   Pulse 82   Ht 5' 2" (1 575 m)   Wt 71 8 kg (158 lb 3 2 oz)   SpO2 96%   BMI 28 94 kg/m²      Mandi is a happy and animated talkative and engaging her pressure was 148/82 her carotids are quiet the lungs are clear cardiac examination reveals regular rhythm and rate no murmurs or gallops the abdomen is a bit obese extremities show no edema peripheral pulses are actually present ankle jerks are absent and she has an impressive loss of pinprick to the mid calf  The skin of the feet is intact Tinel sign is negative patient is quite stable no changes are made in her medications will see her back in 4 months I am happy that her lifestyle is seems to be improved and she has made significant progress in the management of her diabetes           Physical Exam      AWV Clinical     ISAR:   Previous hospitalizations?:  No       Once in a Lifetime Medicare Screening:   EKG performed?:  No    AAA screening performed? (if performed, please add date to Health Maintenance):  No       Medicare Screening Tests and Risk Assessment:   AAA Risk Assessment    Osteoporosis Risk Assessment     Female:  Yes   :  Yes :  No   Age over 48:  Yes Low body weight (<127lbs):  No   Tobacco use:  No Alcohol use:  No   HIV Risk Assessment        Drug and Alcohol Use:   Tobacco use    Cigarettes:  never smoker    Tobacco use duration    Tobacco Cessation Readiness    Alcohol use    Alcohol use:  never    Alcohol Treatment Readiness   Illicit Drug Use    Drug use:  never        Diet & Exercise:   Diet   What is your diet?:  Regular   How many servings a day of the following:   Exercise    Do you currently exercise?:  yes    Frequency:  daily   Minutes per month:  120 Type of exercise:  walking       Cognitive Impairment Screening:   Depression screening preformed:  Yes     PHQ-9 Depression scale score:  3   Depression screening results:  no significant symptoms   Cognitive Impairment Screening    Do you have difficulty learning or retaining new information?:  No Do you have difficulty handling new tasks?:  No   Do you have difficulty with reasoning?:  No Do you have difficulty with spatial ability and orientation?:  No   Do you have difficulty with language?:  No Do you have difficulty with behavior?:  No       Functional Ability/Level of Safety:   Hearing    Hearing difficulties:  No Bilateral:  normal   Left:  normal Right:  normal   Hearing aid:  No    Hearing Impairment Assessment    Hearing status:  No impairment   Current Activities    Status:  unlimited ADL's, unlimited IADL's, unlimited social activities, no driving   Help needed with the folllowing:    Using the phone:  No Transportation:  No   Shopping:  No Preparing Meals:  No   Doing Housework:  No Doing Laundry:  No    Managing Money:  No   ADL    Feeding:  Independant   Oral hygiene and Facial grooming:  Independant   Bathing:  Independant   Upper Body Dressing:  Independant   Lower Body Dressing:  Independant   Toileting:  Independant   Bed Mobility:  Independant   Fall Risk   Have you fallen in the last 12 months?:  No Are you unsteady on your feet?:  No    Are you taking any medications that may cause fatigue or dizziness?:  No   Do you have any chronic conditions that may contribute to a fall?:  Diabetes Do you rush to the bathroom potentially risking a fall?:  No   Injury History    Antidepressant Use:  No   Previous Fall:  No Alcohol Use:  No   Deconditioning:  Yes        Home Safety:   Home Safety Risk Factors   Unfamilar with surroundings:  No Uneven floors:  No   Stairs or handrail saftey risk:  Yes Loose rugs:  No   Household clutter:  No Poor household lighting:  No   No grab bars in bathroom:  No        Advanced Directives:   Advanced Directives    Living Will:  Yes Durable POA for healthcare:  Yes   Patient's End of Life Decisions    Reviewed with patient:  No        Urinary Incontinence:   Do you have urinary incontinence?:  No        Glaucoma:

## 2018-05-07 DIAGNOSIS — E11.8 TYPE 2 DIABETES MELLITUS WITH COMPLICATION, WITHOUT LONG-TERM CURRENT USE OF INSULIN (HCC): ICD-10-CM

## 2018-05-15 ENCOUNTER — TELEPHONE (OUTPATIENT)
Dept: INTERNAL MEDICINE CLINIC | Facility: CLINIC | Age: 83
End: 2018-05-15

## 2018-05-24 DIAGNOSIS — Z79.4 TYPE 2 DIABETES MELLITUS WITHOUT COMPLICATION, WITH LONG-TERM CURRENT USE OF INSULIN (HCC): Primary | ICD-10-CM

## 2018-05-24 DIAGNOSIS — E11.9 TYPE 2 DIABETES MELLITUS WITHOUT COMPLICATION, WITH LONG-TERM CURRENT USE OF INSULIN (HCC): Primary | ICD-10-CM

## 2018-05-24 RX ORDER — INSULIN GLARGINE 100 [IU]/ML
INJECTION, SOLUTION SUBCUTANEOUS
Qty: 60 ML | Refills: 0 | Status: SHIPPED | OUTPATIENT
Start: 2018-05-24 | End: 2018-11-28 | Stop reason: SDUPTHER

## 2018-05-30 DIAGNOSIS — E78.5 HYPERLIPIDEMIA, UNSPECIFIED HYPERLIPIDEMIA TYPE: Primary | ICD-10-CM

## 2018-05-30 RX ORDER — SIMVASTATIN 20 MG
TABLET ORAL
Qty: 90 TABLET | Refills: 1 | Status: SHIPPED | OUTPATIENT
Start: 2018-05-30 | End: 2018-11-28 | Stop reason: SDUPTHER

## 2018-06-06 ENCOUNTER — DOCUMENTATION (OUTPATIENT)
Dept: INTERNAL MEDICINE CLINIC | Facility: CLINIC | Age: 83
End: 2018-06-06

## 2018-06-15 DIAGNOSIS — I10 HYPERTENSION, UNSPECIFIED TYPE: ICD-10-CM

## 2018-06-16 RX ORDER — AMLODIPINE BESYLATE 5 MG/1
TABLET ORAL
Qty: 90 TABLET | Refills: 0 | Status: SHIPPED | OUTPATIENT
Start: 2018-06-16 | End: 2018-09-10 | Stop reason: SDUPTHER

## 2018-08-01 DIAGNOSIS — E11.8 TYPE 2 DIABETES MELLITUS WITH COMPLICATION, WITHOUT LONG-TERM CURRENT USE OF INSULIN (HCC): ICD-10-CM

## 2018-08-01 RX ORDER — LOSARTAN POTASSIUM AND HYDROCHLOROTHIAZIDE 12.5; 5 MG/1; MG/1
TABLET ORAL
Qty: 135 TABLET | Refills: 0 | Status: SHIPPED | OUTPATIENT
Start: 2018-08-01 | End: 2019-03-12 | Stop reason: SDUPTHER

## 2018-08-15 ENCOUNTER — APPOINTMENT (OUTPATIENT)
Dept: LAB | Facility: MEDICAL CENTER | Age: 83
End: 2018-08-15
Payer: MEDICARE

## 2018-08-15 DIAGNOSIS — E11.9 TYPE 2 DIABETES MELLITUS WITHOUT COMPLICATIONS (HCC): ICD-10-CM

## 2018-08-15 LAB
25(OH)D3 SERPL-MCNC: 34.8 NG/ML (ref 30–100)
ANION GAP SERPL CALCULATED.3IONS-SCNC: 7 MMOL/L (ref 4–13)
BACTERIA UR QL AUTO: ABNORMAL /HPF
BILIRUB UR QL STRIP: NEGATIVE
BUN SERPL-MCNC: 15 MG/DL (ref 5–25)
CALCIUM SERPL-MCNC: 9.6 MG/DL (ref 8.3–10.1)
CHLORIDE SERPL-SCNC: 104 MMOL/L (ref 100–108)
CLARITY UR: CLEAR
CO2 SERPL-SCNC: 30 MMOL/L (ref 21–32)
COLOR UR: YELLOW
CREAT SERPL-MCNC: 0.76 MG/DL (ref 0.6–1.3)
CREAT UR-MCNC: 82.8 MG/DL
EST. AVERAGE GLUCOSE BLD GHB EST-MCNC: 174 MG/DL
GFR SERPL CREATININE-BSD FRML MDRD: 70 ML/MIN/1.73SQ M
GLUCOSE P FAST SERPL-MCNC: 161 MG/DL (ref 65–99)
GLUCOSE UR STRIP-MCNC: NEGATIVE MG/DL
HBA1C MFR BLD: 7.7 % (ref 4.2–6.3)
HGB UR QL STRIP.AUTO: NEGATIVE
HYALINE CASTS #/AREA URNS LPF: ABNORMAL /LPF
KETONES UR STRIP-MCNC: NEGATIVE MG/DL
LEUKOCYTE ESTERASE UR QL STRIP: ABNORMAL
MICROALBUMIN UR-MCNC: 28.9 MG/L (ref 0–20)
MICROALBUMIN/CREAT 24H UR: 35 MG/G CREATININE (ref 0–30)
NITRITE UR QL STRIP: NEGATIVE
NON-SQ EPI CELLS URNS QL MICRO: ABNORMAL /HPF
PH UR STRIP.AUTO: 6 [PH] (ref 4.5–8)
POTASSIUM SERPL-SCNC: 3.5 MMOL/L (ref 3.5–5.3)
PROT UR STRIP-MCNC: NEGATIVE MG/DL
RBC #/AREA URNS AUTO: ABNORMAL /HPF
SODIUM SERPL-SCNC: 141 MMOL/L (ref 136–145)
SP GR UR STRIP.AUTO: 1.02 (ref 1–1.03)
TSH SERPL DL<=0.05 MIU/L-ACNC: 1.56 UIU/ML (ref 0.36–3.74)
UROBILINOGEN UR QL STRIP.AUTO: 0.2 E.U./DL
WBC #/AREA URNS AUTO: ABNORMAL /HPF

## 2018-08-15 PROCEDURE — 82043 UR ALBUMIN QUANTITATIVE: CPT

## 2018-08-15 PROCEDURE — 84443 ASSAY THYROID STIM HORMONE: CPT

## 2018-08-15 PROCEDURE — 82306 VITAMIN D 25 HYDROXY: CPT

## 2018-08-15 PROCEDURE — 82570 ASSAY OF URINE CREATININE: CPT

## 2018-08-15 PROCEDURE — 36415 COLL VENOUS BLD VENIPUNCTURE: CPT

## 2018-08-15 PROCEDURE — 81001 URINALYSIS AUTO W/SCOPE: CPT

## 2018-08-15 PROCEDURE — 83036 HEMOGLOBIN GLYCOSYLATED A1C: CPT

## 2018-08-15 PROCEDURE — 80048 BASIC METABOLIC PNL TOTAL CA: CPT

## 2018-08-22 ENCOUNTER — OFFICE VISIT (OUTPATIENT)
Dept: INTERNAL MEDICINE CLINIC | Facility: CLINIC | Age: 83
End: 2018-08-22
Payer: MEDICARE

## 2018-08-22 VITALS — HEIGHT: 62 IN | BODY MASS INDEX: 29.44 KG/M2 | HEART RATE: 92 BPM | WEIGHT: 160 LBS | OXYGEN SATURATION: 96 %

## 2018-08-22 DIAGNOSIS — Z79.4 TYPE 2 DIABETES MELLITUS WITH OTHER SPECIFIED COMPLICATION, WITH LONG-TERM CURRENT USE OF INSULIN (HCC): Primary | ICD-10-CM

## 2018-08-22 DIAGNOSIS — E11.69 TYPE 2 DIABETES MELLITUS WITH OTHER SPECIFIED COMPLICATION, WITH LONG-TERM CURRENT USE OF INSULIN (HCC): Primary | ICD-10-CM

## 2018-08-22 PROCEDURE — 99213 OFFICE O/P EST LOW 20 MIN: CPT | Performed by: INTERNAL MEDICINE

## 2018-08-22 NOTE — PROGRESS NOTES
Assessment/Plan:    No problem-specific Assessment & Plan notes found for this encounter  There are no diagnoses linked to this encounter  Subjective:      Patient ID: Rabia Olivarez is a 80 y o  female  HPI Saúl Mcgraw is here today for visit she feels well she will be 80years old in a few weeks  She lives alone is an is a rug at individual list   She says she spent her whole life take care of the rest of her family and this is resulted in her being quite self-sufficient and independent  She sees Dr Claudia garcia podiatrist   She sees Dr Darshan Daly list house her ophthalmologist who fears that she may lose vision in her right eye  Otherwise she feels well 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    The following portions of the patient's history were reviewed and updated as appropriate: allergies, current medications, past family history, past medical history, past social history, past surgical history and problem list     Review of Systems  noncontributory    Objective:      Pulse 92   Ht 5' 2" (1 575 m)   Wt 72 6 kg (160 lb)   SpO2 96%   BMI 29 26 kg/m²          Physical Exam  Mandi gets around with a single-point cane she is alert vivacious talkative and very sharp her blood pressure is 144/80 her pulse is in the mid 70s and is quite regular the carotids are unremarkable lungs are clear cardiac examination is unremarkable her skin is warm and dry with the bunch of abrasions on her arms from her previous yd work    There is no significant edema we reviewed her lab studies her microalbumin is minimal considering the length of time she has been diabetic and her last A1c is 7 7 patient is stable no changes are made in her medications will see her back in 4 months

## 2018-09-10 DIAGNOSIS — Z00.00 HEALTH CARE MAINTENANCE: ICD-10-CM

## 2018-09-10 DIAGNOSIS — I10 HYPERTENSION, UNSPECIFIED TYPE: ICD-10-CM

## 2018-09-10 RX ORDER — AMLODIPINE BESYLATE 5 MG/1
TABLET ORAL
Qty: 90 TABLET | Refills: 0 | Status: SHIPPED | OUTPATIENT
Start: 2018-09-10 | End: 2018-12-09 | Stop reason: SDUPTHER

## 2018-09-10 RX ORDER — CLOPIDOGREL BISULFATE 75 MG/1
75 TABLET ORAL DAILY
Qty: 90 TABLET | Refills: 1 | Status: SHIPPED | OUTPATIENT
Start: 2018-09-10 | End: 2019-04-01 | Stop reason: SDUPTHER

## 2018-09-10 NOTE — TELEPHONE ENCOUNTER
Karolyn called stating she needs a refill on her Plavix and when she called [facility] another dotors name was on it and the prescription number for it i is [phone]  Phone is [phone]

## 2018-10-15 DIAGNOSIS — E11.8 TYPE 2 DIABETES MELLITUS WITH COMPLICATION, WITHOUT LONG-TERM CURRENT USE OF INSULIN (HCC): ICD-10-CM

## 2018-11-20 LAB
LEFT EYE DIABETIC RETINOPATHY: NORMAL
RIGHT EYE DIABETIC RETINOPATHY: NORMAL
SEVERITY (EYE EXAM): NORMAL

## 2018-11-28 DIAGNOSIS — Z79.4 TYPE 2 DIABETES MELLITUS WITHOUT COMPLICATION, WITH LONG-TERM CURRENT USE OF INSULIN (HCC): ICD-10-CM

## 2018-11-28 DIAGNOSIS — E11.9 TYPE 2 DIABETES MELLITUS WITHOUT COMPLICATION, WITH LONG-TERM CURRENT USE OF INSULIN (HCC): ICD-10-CM

## 2018-11-28 DIAGNOSIS — E78.5 HYPERLIPIDEMIA, UNSPECIFIED HYPERLIPIDEMIA TYPE: ICD-10-CM

## 2018-11-28 RX ORDER — INSULIN GLARGINE 100 [IU]/ML
INJECTION, SOLUTION SUBCUTANEOUS
Qty: 60 ML | Refills: 0 | Status: SHIPPED | OUTPATIENT
Start: 2018-11-28

## 2018-11-28 RX ORDER — SIMVASTATIN 20 MG
TABLET ORAL
Qty: 90 TABLET | Refills: 1 | Status: SHIPPED | OUTPATIENT
Start: 2018-11-28

## 2018-12-09 DIAGNOSIS — I10 HYPERTENSION, UNSPECIFIED TYPE: ICD-10-CM

## 2018-12-09 RX ORDER — AMLODIPINE BESYLATE 5 MG/1
TABLET ORAL
Qty: 90 TABLET | Refills: 0 | Status: SHIPPED | OUTPATIENT
Start: 2018-12-09 | End: 2019-03-09 | Stop reason: SDUPTHER

## 2018-12-20 DIAGNOSIS — E11.9 TYPE 2 DIABETES MELLITUS WITHOUT COMPLICATION, WITHOUT LONG-TERM CURRENT USE OF INSULIN (HCC): Primary | ICD-10-CM

## 2018-12-22 ENCOUNTER — APPOINTMENT (OUTPATIENT)
Dept: LAB | Facility: MEDICAL CENTER | Age: 83
End: 2018-12-22
Payer: MEDICARE

## 2018-12-22 DIAGNOSIS — E11.9 TYPE 2 DIABETES MELLITUS WITHOUT COMPLICATION, WITHOUT LONG-TERM CURRENT USE OF INSULIN (HCC): ICD-10-CM

## 2018-12-22 LAB
ANION GAP SERPL CALCULATED.3IONS-SCNC: 6 MMOL/L (ref 4–13)
BUN SERPL-MCNC: 19 MG/DL (ref 5–25)
CALCIUM SERPL-MCNC: 9.3 MG/DL (ref 8.3–10.1)
CHLORIDE SERPL-SCNC: 107 MMOL/L (ref 100–108)
CO2 SERPL-SCNC: 28 MMOL/L (ref 21–32)
CREAT SERPL-MCNC: 0.73 MG/DL (ref 0.6–1.3)
EST. AVERAGE GLUCOSE BLD GHB EST-MCNC: 186 MG/DL
GFR SERPL CREATININE-BSD FRML MDRD: 73 ML/MIN/1.73SQ M
GLUCOSE P FAST SERPL-MCNC: 97 MG/DL (ref 65–99)
HBA1C MFR BLD: 8.1 % (ref 4.2–6.3)
POTASSIUM SERPL-SCNC: 3.8 MMOL/L (ref 3.5–5.3)
SODIUM SERPL-SCNC: 141 MMOL/L (ref 136–145)

## 2018-12-22 PROCEDURE — 80048 BASIC METABOLIC PNL TOTAL CA: CPT

## 2018-12-22 PROCEDURE — 36415 COLL VENOUS BLD VENIPUNCTURE: CPT

## 2018-12-22 PROCEDURE — 83036 HEMOGLOBIN GLYCOSYLATED A1C: CPT

## 2019-01-03 ENCOUNTER — OFFICE VISIT (OUTPATIENT)
Dept: INTERNAL MEDICINE CLINIC | Facility: CLINIC | Age: 84
End: 2019-01-03
Payer: MEDICARE

## 2019-01-03 VITALS
TEMPERATURE: 98.7 F | OXYGEN SATURATION: 99 % | WEIGHT: 158.4 LBS | HEART RATE: 83 BPM | HEIGHT: 62 IN | BODY MASS INDEX: 29.15 KG/M2

## 2019-01-03 DIAGNOSIS — E11.9 TYPE 2 DIABETES MELLITUS WITHOUT COMPLICATION, WITHOUT LONG-TERM CURRENT USE OF INSULIN (HCC): Primary | ICD-10-CM

## 2019-01-03 PROCEDURE — 99213 OFFICE O/P EST LOW 20 MIN: CPT | Performed by: INTERNAL MEDICINE

## 2019-01-03 NOTE — PROGRESS NOTES
Assessment/Plan:    No problem-specific Assessment & Plan notes found for this encounter  Problem List Items Addressed This Visit     None            Subjective:      Patient ID: Mi Umana is a 80 y o  female  HPI  HUNTER Raymond is here today for visit she feels quite well  She is a  she lives alone her son takes her food shopping once a month and a grandson may take her an additional time  She remains under the care of Dr Jose Martin rg her ophthalmologist who is concerned about her deteriorating vision she sees Dr Soto fail her podiatrist   She has had several episodes where her morning blood sugars were less than 100 occasionally 60-80 she was asymptomatic    The following portions of the patient's history were reviewed and updated as appropriate: allergies, current medications, past family history, past medical history, past social history, past surgical history and problem list     Review of Systems      Objective:      Pulse 83   Temp 98 7 °F (37 1 °C) (Tympanic)   Ht 5' 2" (1 575 m)   Wt 71 8 kg (158 lb 6 4 oz)   SpO2 99%   BMI 28 97 kg/m²          Physical Exam  she appears well in no distress she is alert feisty and regularly independent pressure is 130/80 the carotids are quiet the lungs are clear cardiac examination is unremarkable there is no significant edema the feet look okay we spent most of the time talking about her diabetic management her last A1c was 8 1  Will reduce her metformin from a 1000-500  Will also reduce her Lantus insulin from 35 units to 32 units also going to suggest that she take the Lantus in the morning    I am all of finally I will ask her to check her sugars twice a day and report the readings to us she feels quite safe incapable of remaining in the house alone at this time I also emphasized the need for an evening snack

## 2019-01-09 ENCOUNTER — TELEPHONE (OUTPATIENT)
Dept: INTERNAL MEDICINE CLINIC | Facility: CLINIC | Age: 84
End: 2019-01-09

## 2019-01-10 DIAGNOSIS — E11.8 TYPE 2 DIABETES MELLITUS WITH COMPLICATION, WITHOUT LONG-TERM CURRENT USE OF INSULIN (HCC): ICD-10-CM

## 2019-01-11 ENCOUNTER — TELEPHONE (OUTPATIENT)
Dept: INTERNAL MEDICINE CLINIC | Facility: CLINIC | Age: 84
End: 2019-01-11

## 2019-01-11 NOTE — TELEPHONE ENCOUNTER
Problem solved at this time   PT did reach [facility] and they have the battery and her son is going to pick it up

## 2019-01-11 NOTE — TELEPHONE ENCOUNTER
PT states that her glucose meter is suddenly not working  She states she has no way to check the battery life or replace the unit because she doesn't drive and doesn't want to bother anyone  I did suggest she call [facility] to see what options they have

## 2019-02-19 ENCOUNTER — TELEPHONE (OUTPATIENT)
Dept: INTERNAL MEDICINE CLINIC | Facility: CLINIC | Age: 84
End: 2019-02-19

## 2019-02-19 DIAGNOSIS — E55.9 VITAMIN D DEFICIENCY: ICD-10-CM

## 2019-02-19 DIAGNOSIS — E78.2 MIXED HYPERLIPIDEMIA: ICD-10-CM

## 2019-02-19 DIAGNOSIS — E11.9 TYPE 2 DIABETES MELLITUS WITHOUT COMPLICATION, UNSPECIFIED WHETHER LONG TERM INSULIN USE (HCC): Primary | ICD-10-CM

## 2019-02-26 ENCOUNTER — APPOINTMENT (OUTPATIENT)
Dept: LAB | Facility: MEDICAL CENTER | Age: 84
End: 2019-02-26
Payer: MEDICARE

## 2019-02-26 ENCOUNTER — TELEPHONE (OUTPATIENT)
Dept: INTERNAL MEDICINE CLINIC | Facility: CLINIC | Age: 84
End: 2019-02-26

## 2019-02-26 DIAGNOSIS — E78.2 MIXED HYPERLIPIDEMIA: ICD-10-CM

## 2019-02-26 DIAGNOSIS — E55.9 VITAMIN D DEFICIENCY: ICD-10-CM

## 2019-02-26 DIAGNOSIS — E11.9 TYPE 2 DIABETES MELLITUS WITHOUT COMPLICATION, UNSPECIFIED WHETHER LONG TERM INSULIN USE (HCC): ICD-10-CM

## 2019-02-26 LAB
25(OH)D3 SERPL-MCNC: 55 NG/ML (ref 30–100)
ALBUMIN SERPL BCP-MCNC: 3.8 G/DL (ref 3.5–5)
ALP SERPL-CCNC: 53 U/L (ref 46–116)
ALT SERPL W P-5'-P-CCNC: 25 U/L (ref 12–78)
ANION GAP SERPL CALCULATED.3IONS-SCNC: 6 MMOL/L (ref 4–13)
AST SERPL W P-5'-P-CCNC: 17 U/L (ref 5–45)
BASOPHILS # BLD AUTO: 0.08 THOUSANDS/ΜL (ref 0–0.1)
BASOPHILS NFR BLD AUTO: 1 % (ref 0–1)
BILIRUB SERPL-MCNC: 0.5 MG/DL (ref 0.2–1)
BUN SERPL-MCNC: 18 MG/DL (ref 5–25)
CALCIUM SERPL-MCNC: 9.1 MG/DL (ref 8.3–10.1)
CHLORIDE SERPL-SCNC: 105 MMOL/L (ref 100–108)
CHOLEST SERPL-MCNC: 134 MG/DL (ref 50–200)
CO2 SERPL-SCNC: 30 MMOL/L (ref 21–32)
CREAT SERPL-MCNC: 0.72 MG/DL (ref 0.6–1.3)
EOSINOPHIL # BLD AUTO: 0.15 THOUSAND/ΜL (ref 0–0.61)
EOSINOPHIL NFR BLD AUTO: 2 % (ref 0–6)
ERYTHROCYTE [DISTWIDTH] IN BLOOD BY AUTOMATED COUNT: 13.2 % (ref 11.6–15.1)
EST. AVERAGE GLUCOSE BLD GHB EST-MCNC: 192 MG/DL
GFR SERPL CREATININE-BSD FRML MDRD: 74 ML/MIN/1.73SQ M
GLUCOSE P FAST SERPL-MCNC: 182 MG/DL (ref 65–99)
HBA1C MFR BLD: 8.3 % (ref 4.2–6.3)
HCT VFR BLD AUTO: 43.2 % (ref 34.8–46.1)
HDLC SERPL-MCNC: 48 MG/DL (ref 40–60)
HGB BLD-MCNC: 13.9 G/DL (ref 11.5–15.4)
IMM GRANULOCYTES # BLD AUTO: 0.03 THOUSAND/UL (ref 0–0.2)
IMM GRANULOCYTES NFR BLD AUTO: 1 % (ref 0–2)
LDLC SERPL CALC-MCNC: 59 MG/DL (ref 0–100)
LYMPHOCYTES # BLD AUTO: 1.55 THOUSANDS/ΜL (ref 0.6–4.47)
LYMPHOCYTES NFR BLD AUTO: 23 % (ref 14–44)
MCH RBC QN AUTO: 31 PG (ref 26.8–34.3)
MCHC RBC AUTO-ENTMCNC: 32.2 G/DL (ref 31.4–37.4)
MCV RBC AUTO: 96 FL (ref 82–98)
MONOCYTES # BLD AUTO: 0.69 THOUSAND/ΜL (ref 0.17–1.22)
MONOCYTES NFR BLD AUTO: 10 % (ref 4–12)
NEUTROPHILS # BLD AUTO: 4.14 THOUSANDS/ΜL (ref 1.85–7.62)
NEUTS SEG NFR BLD AUTO: 63 % (ref 43–75)
NONHDLC SERPL-MCNC: 86 MG/DL
NRBC BLD AUTO-RTO: 0 /100 WBCS
PLATELET # BLD AUTO: 235 THOUSANDS/UL (ref 149–390)
PMV BLD AUTO: 11.2 FL (ref 8.9–12.7)
POTASSIUM SERPL-SCNC: 3.5 MMOL/L (ref 3.5–5.3)
PROT SERPL-MCNC: 6.8 G/DL (ref 6.4–8.2)
RBC # BLD AUTO: 4.49 MILLION/UL (ref 3.81–5.12)
SODIUM SERPL-SCNC: 141 MMOL/L (ref 136–145)
TRIGL SERPL-MCNC: 134 MG/DL
TSH SERPL DL<=0.05 MIU/L-ACNC: 1.36 UIU/ML (ref 0.36–3.74)
WBC # BLD AUTO: 6.64 THOUSAND/UL (ref 4.31–10.16)

## 2019-02-26 PROCEDURE — 85025 COMPLETE CBC W/AUTO DIFF WBC: CPT

## 2019-02-26 PROCEDURE — 80053 COMPREHEN METABOLIC PANEL: CPT

## 2019-02-26 PROCEDURE — 84443 ASSAY THYROID STIM HORMONE: CPT

## 2019-02-26 PROCEDURE — 83036 HEMOGLOBIN GLYCOSYLATED A1C: CPT

## 2019-02-26 PROCEDURE — 80061 LIPID PANEL: CPT

## 2019-02-26 PROCEDURE — 82306 VITAMIN D 25 HYDROXY: CPT

## 2019-02-26 PROCEDURE — 36415 COLL VENOUS BLD VENIPUNCTURE: CPT

## 2019-02-27 ENCOUNTER — APPOINTMENT (OUTPATIENT)
Dept: LAB | Facility: MEDICAL CENTER | Age: 84
End: 2019-02-27
Payer: MEDICARE

## 2019-02-27 ENCOUNTER — TRANSCRIBE ORDERS (OUTPATIENT)
Dept: ADMINISTRATIVE | Facility: HOSPITAL | Age: 84
End: 2019-02-27

## 2019-02-27 DIAGNOSIS — E78.2 MIXED HYPERLIPIDEMIA: Primary | ICD-10-CM

## 2019-02-27 LAB
BACTERIA UR QL AUTO: ABNORMAL /HPF
BILIRUB UR QL STRIP: NEGATIVE
CLARITY UR: ABNORMAL
COLOR UR: YELLOW
CREAT UR-MCNC: 70.7 MG/DL
GLUCOSE UR STRIP-MCNC: ABNORMAL MG/DL
HGB UR QL STRIP.AUTO: NEGATIVE
HYALINE CASTS #/AREA URNS LPF: ABNORMAL /LPF
KETONES UR STRIP-MCNC: NEGATIVE MG/DL
LEUKOCYTE ESTERASE UR QL STRIP: ABNORMAL
MICROALBUMIN UR-MCNC: 31.3 MG/L (ref 0–20)
MICROALBUMIN/CREAT 24H UR: 44 MG/G CREATININE (ref 0–30)
NITRITE UR QL STRIP: NEGATIVE
NON-SQ EPI CELLS URNS QL MICRO: ABNORMAL /HPF
PH UR STRIP.AUTO: 7.5 [PH] (ref 4.5–8)
PROT UR STRIP-MCNC: NEGATIVE MG/DL
RBC #/AREA URNS AUTO: ABNORMAL /HPF
SP GR UR STRIP.AUTO: 1.02 (ref 1–1.03)
UROBILINOGEN UR QL STRIP.AUTO: 0.2 E.U./DL
WBC #/AREA URNS AUTO: ABNORMAL /HPF

## 2019-02-27 PROCEDURE — 81001 URINALYSIS AUTO W/SCOPE: CPT

## 2019-02-27 PROCEDURE — 82043 UR ALBUMIN QUANTITATIVE: CPT

## 2019-02-27 PROCEDURE — 82570 ASSAY OF URINE CREATININE: CPT

## 2019-02-27 PROCEDURE — 87086 URINE CULTURE/COLONY COUNT: CPT

## 2019-02-28 LAB — BACTERIA UR CULT: NORMAL

## 2019-03-09 DIAGNOSIS — I10 HYPERTENSION, UNSPECIFIED TYPE: ICD-10-CM

## 2019-03-09 RX ORDER — AMLODIPINE BESYLATE 5 MG/1
TABLET ORAL
Qty: 90 TABLET | Refills: 0 | Status: SHIPPED | OUTPATIENT
Start: 2019-03-09

## 2019-03-12 DIAGNOSIS — E11.8 TYPE 2 DIABETES MELLITUS WITH COMPLICATION, WITHOUT LONG-TERM CURRENT USE OF INSULIN (HCC): ICD-10-CM

## 2019-03-12 RX ORDER — LOSARTAN POTASSIUM AND HYDROCHLOROTHIAZIDE 12.5; 5 MG/1; MG/1
TABLET ORAL
Qty: 135 TABLET | Refills: 0 | Status: SHIPPED | OUTPATIENT
Start: 2019-03-12

## 2019-03-18 ENCOUNTER — OFFICE VISIT (OUTPATIENT)
Dept: INTERNAL MEDICINE CLINIC | Facility: CLINIC | Age: 84
End: 2019-03-18
Payer: MEDICARE

## 2019-03-18 VITALS
HEART RATE: 94 BPM | DIASTOLIC BLOOD PRESSURE: 70 MMHG | OXYGEN SATURATION: 96 % | WEIGHT: 154.6 LBS | HEIGHT: 62 IN | TEMPERATURE: 98.4 F | BODY MASS INDEX: 28.45 KG/M2 | SYSTOLIC BLOOD PRESSURE: 130 MMHG

## 2019-03-18 DIAGNOSIS — E11.8 TYPE 2 DIABETES MELLITUS WITH COMPLICATION, WITH LONG-TERM CURRENT USE OF INSULIN (HCC): Primary | ICD-10-CM

## 2019-03-18 DIAGNOSIS — Z79.4 TYPE 2 DIABETES MELLITUS WITH COMPLICATION, WITH LONG-TERM CURRENT USE OF INSULIN (HCC): Primary | ICD-10-CM

## 2019-03-18 DIAGNOSIS — D32.9 MENINGIOMA (HCC): ICD-10-CM

## 2019-03-18 PROCEDURE — 99214 OFFICE O/P EST MOD 30 MIN: CPT | Performed by: INTERNAL MEDICINE

## 2019-03-18 NOTE — PROGRESS NOTES
Assessment/Plan:    No problem-specific Assessment & Plan notes found for this encounter  There are no diagnoses linked to this encounter  Subjective:      Patient ID: Paul Tirado is a 80 y o  female  HAWK Bensonores is here today for visit she feels well she lives alone and actually enjoys she does get a little only she no longer drives and 2 years ago she gave up doing rale she continues under the care of Dr Tasha rg her ophthalmologist and also Dr Yuli Smart her podiatrist   She checks her sugars on a regular basis in the fasting the usually 120-125 during her previous visit we reduced her Lantus from 35-32 and also reduced her metformin  She really has no complaints at this point she continues to take Plavix she was hospitalized elsewhere in approximately 2011 for neurologic event and has been on Plavix ever since at this point she is neurologically unremarkable is had no recurrence of any symptoms    The following portions of the patient's history were reviewed and updated as appropriate: allergies, current medications, past family history, past medical history, past social history, past surgical history and problem list     Review of Systems  noncontributory    Objective:      /70   Pulse 94   Temp 98 4 °F (36 9 °C) (Tympanic)   Ht 5' 2" (1 575 m)   Wt 70 1 kg (154 lb 9 6 oz)   SpO2 96%   BMI 28 28 kg/m²          Physical Exam  she appears well in no distress her pressure is 130/70 the carotids are quiet the lungs are clear cardiac examination reveals a a strong heartbeat regular rhythm no murmurs or gallops extremities show no edema    Patient is quite stable no changes are made in her medication she is not due for any further lab studies at this time I did caution her that we do not want her sugars to be too low she is known small meningiomas that have been followed for many years and at this point she is not interested in any further intervention or imaging and she may want to consider reducing her insulin if her sugars get much lower than they are now will see her back in 4 months and wish her well over the winter months  BMI Counseling: Body mass index is 28 28 kg/m²  Discussed the patient's BMI with her  The BMI is above average  BMI counseling and education was provided to the patient  Nutrition recommendations include reducing portion sizes

## 2019-04-01 DIAGNOSIS — Z00.00 HEALTH CARE MAINTENANCE: ICD-10-CM

## 2019-04-01 RX ORDER — CLOPIDOGREL BISULFATE 75 MG/1
TABLET ORAL
Qty: 90 TABLET | Refills: 1 | Status: SHIPPED | OUTPATIENT
Start: 2019-04-01

## 2019-06-03 ENCOUNTER — TELEPHONE (OUTPATIENT)
Dept: INTERNAL MEDICINE CLINIC | Facility: CLINIC | Age: 84
End: 2019-06-03

## 2019-07-25 NOTE — TELEPHONE ENCOUNTER
Orders are in the chart 
PT requests order placed in Epic for usual lab test prior to her 3/5 appt  She will use [facility]  Please confirm w   PT
Airway patent

## 2021-04-16 ENCOUNTER — HOSPITAL ENCOUNTER (EMERGENCY)
Facility: HOSPITAL | Age: 86
DRG: 085 | End: 2021-04-16
Payer: MEDICARE

## 2021-04-16 ENCOUNTER — APPOINTMENT (EMERGENCY)
Dept: CT IMAGING | Facility: HOSPITAL | Age: 86
DRG: 085 | End: 2021-04-16
Payer: MEDICARE

## 2021-04-16 ENCOUNTER — HOSPITAL ENCOUNTER (INPATIENT)
Facility: HOSPITAL | Age: 86
LOS: 2 days | Discharge: NURSING, REHAB OR LONG-TERM CARE | DRG: 085 | End: 2021-04-19
Attending: EMERGENCY MEDICINE | Admitting: SURGERY
Payer: MEDICARE

## 2021-04-16 VITALS
OXYGEN SATURATION: 98 % | SYSTOLIC BLOOD PRESSURE: 150 MMHG | DIASTOLIC BLOOD PRESSURE: 67 MMHG | BODY MASS INDEX: 25.28 KG/M2 | HEART RATE: 70 BPM | RESPIRATION RATE: 16 BRPM | TEMPERATURE: 97.7 F | WEIGHT: 138.23 LBS

## 2021-04-16 DIAGNOSIS — S01.01XA SCALP LACERATION, INITIAL ENCOUNTER: ICD-10-CM

## 2021-04-16 DIAGNOSIS — S09.90XA ACUTE HEAD INJURY WITHOUT LOSS OF CONSCIOUSNESS, INITIAL ENCOUNTER: ICD-10-CM

## 2021-04-16 DIAGNOSIS — S09.90XA CLOSED HEAD INJURY, INITIAL ENCOUNTER: Primary | ICD-10-CM

## 2021-04-16 DIAGNOSIS — N39.0 UTI (URINARY TRACT INFECTION): ICD-10-CM

## 2021-04-16 DIAGNOSIS — E04.1 THYROID NODULE: ICD-10-CM

## 2021-04-16 DIAGNOSIS — E87.6 HYPOKALEMIA: ICD-10-CM

## 2021-04-16 DIAGNOSIS — S00.03XA CONTUSION OF SCALP, INITIAL ENCOUNTER: ICD-10-CM

## 2021-04-16 DIAGNOSIS — D32.9 MENINGIOMA (HCC): ICD-10-CM

## 2021-04-16 DIAGNOSIS — I62.9 INTRACRANIAL HEMORRHAGE (HCC): Primary | ICD-10-CM
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
## 2021-04-16 LAB
ANION GAP SERPL CALCULATED.3IONS-SCNC: 6 MMOL/L (ref 4–13)
ATRIAL RATE: 208 BPM
ATRIAL RATE: 84 BPM
BACTERIA UR QL AUTO: ABNORMAL /HPF
BASOPHILS # BLD AUTO: 0.06 THOUSANDS/ΜL (ref 0–0.1)
BASOPHILS NFR BLD AUTO: 1 % (ref 0–1)
BILIRUB UR QL STRIP: NEGATIVE
BUN SERPL-MCNC: 18 MG/DL (ref 5–25)
CALCIUM SERPL-MCNC: 9.2 MG/DL (ref 8.3–10.1)
CHLORIDE SERPL-SCNC: 106 MMOL/L (ref 100–108)
CLARITY UR: ABNORMAL
CO2 SERPL-SCNC: 30 MMOL/L (ref 21–32)
COLOR UR: YELLOW
CREAT SERPL-MCNC: 0.59 MG/DL (ref 0.6–1.3)
EOSINOPHIL # BLD AUTO: 0.21 THOUSAND/ΜL (ref 0–0.61)
EOSINOPHIL NFR BLD AUTO: 3 % (ref 0–6)
ERYTHROCYTE [DISTWIDTH] IN BLOOD BY AUTOMATED COUNT: 12.7 % (ref 11.6–15.1)
FLUAV RNA RESP QL NAA+PROBE: NEGATIVE
FLUBV RNA RESP QL NAA+PROBE: NEGATIVE
GFR SERPL CREATININE-BSD FRML MDRD: 80 ML/MIN/1.73SQ M
GLUCOSE SERPL-MCNC: 109 MG/DL (ref 65–140)
GLUCOSE SERPL-MCNC: 117 MG/DL (ref 65–140)
GLUCOSE SERPL-MCNC: 128 MG/DL (ref 65–140)
GLUCOSE UR STRIP-MCNC: NEGATIVE MG/DL
HCT VFR BLD AUTO: 38.7 % (ref 34.8–46.1)
HGB BLD-MCNC: 12.6 G/DL (ref 11.5–15.4)
HGB UR QL STRIP.AUTO: ABNORMAL
IMM GRANULOCYTES # BLD AUTO: 0.02 THOUSAND/UL (ref 0–0.2)
IMM GRANULOCYTES NFR BLD AUTO: 0 % (ref 0–2)
KETONES UR STRIP-MCNC: NEGATIVE MG/DL
LEUKOCYTE ESTERASE UR QL STRIP: ABNORMAL
LYMPHOCYTES # BLD AUTO: 1.19 THOUSANDS/ΜL (ref 0.6–4.47)
LYMPHOCYTES NFR BLD AUTO: 17 % (ref 14–44)
MCH RBC QN AUTO: 31 PG (ref 26.8–34.3)
MCHC RBC AUTO-ENTMCNC: 32.6 G/DL (ref 31.4–37.4)
MCV RBC AUTO: 95 FL (ref 82–98)
MONOCYTES # BLD AUTO: 0.69 THOUSAND/ΜL (ref 0.17–1.22)
MONOCYTES NFR BLD AUTO: 10 % (ref 4–12)
NEUTROPHILS # BLD AUTO: 4.98 THOUSANDS/ΜL (ref 1.85–7.62)
NEUTS SEG NFR BLD AUTO: 69 % (ref 43–75)
NITRITE UR QL STRIP: POSITIVE
NON-SQ EPI CELLS URNS QL MICRO: ABNORMAL /HPF
NRBC BLD AUTO-RTO: 0 /100 WBCS
P AXIS: 70 DEGREES
PH UR STRIP.AUTO: 5.5 [PH] (ref 4.5–8)
PLATELET # BLD AUTO: 233 THOUSANDS/UL (ref 149–390)
PMV BLD AUTO: 10.2 FL (ref 8.9–12.7)
POTASSIUM SERPL-SCNC: 3.2 MMOL/L (ref 3.5–5.3)
PR INTERVAL: 248 MS
PROT UR STRIP-MCNC: NEGATIVE MG/DL
QRS AXIS: -4 DEGREES
QRS AXIS: 9 DEGREES
QRSD INTERVAL: 66 MS
QRSD INTERVAL: 70 MS
QT INTERVAL: 364 MS
QT INTERVAL: 398 MS
QTC INTERVAL: 420 MS
QTC INTERVAL: 430 MS
RBC # BLD AUTO: 4.06 MILLION/UL (ref 3.81–5.12)
RBC #/AREA URNS AUTO: ABNORMAL /HPF
RSV RNA RESP QL NAA+PROBE: NEGATIVE
SARS-COV-2 RNA RESP QL NAA+PROBE: NEGATIVE
SODIUM SERPL-SCNC: 142 MMOL/L (ref 136–145)
SP GR UR STRIP.AUTO: 1.02 (ref 1–1.03)
T WAVE AXIS: -18 DEGREES
T WAVE AXIS: 13 DEGREES
TROPONIN I SERPL-MCNC: <0.02 NG/ML
TROPONIN I SERPL-MCNC: <0.02 NG/ML
UROBILINOGEN UR QL STRIP.AUTO: 0.2 E.U./DL
VENTRICULAR RATE: 67 BPM
VENTRICULAR RATE: 84 BPM
WBC # BLD AUTO: 7.15 THOUSAND/UL (ref 4.31–10.16)
WBC #/AREA URNS AUTO: ABNORMAL /HPF

## 2021-04-16 PROCEDURE — 93005 ELECTROCARDIOGRAM TRACING: CPT

## 2021-04-16 PROCEDURE — 99220 PR INITIAL OBSERVATION CARE/DAY 70 MINUTES: CPT | Performed by: EMERGENCY MEDICINE

## 2021-04-16 PROCEDURE — 82948 REAGENT STRIP/BLOOD GLUCOSE: CPT

## 2021-04-16 PROCEDURE — 70450 CT HEAD/BRAIN W/O DYE: CPT

## 2021-04-16 PROCEDURE — 99284 EMERGENCY DEPT VISIT MOD MDM: CPT

## 2021-04-16 PROCEDURE — 99291 CRITICAL CARE FIRST HOUR: CPT | Performed by: PHYSICIAN ASSISTANT

## 2021-04-16 PROCEDURE — 0241U HB NFCT DS VIR RESP RNA 4 TRGT: CPT | Performed by: PHYSICIAN ASSISTANT

## 2021-04-16 PROCEDURE — 81001 URINALYSIS AUTO W/SCOPE: CPT

## 2021-04-16 PROCEDURE — 96365 THER/PROPH/DIAG IV INF INIT: CPT

## 2021-04-16 PROCEDURE — 80048 BASIC METABOLIC PNL TOTAL CA: CPT | Performed by: PHYSICIAN ASSISTANT

## 2021-04-16 PROCEDURE — 72125 CT NECK SPINE W/O DYE: CPT

## 2021-04-16 PROCEDURE — 96375 TX/PRO/DX INJ NEW DRUG ADDON: CPT

## 2021-04-16 PROCEDURE — 85025 COMPLETE CBC W/AUTO DIFF WBC: CPT | Performed by: PHYSICIAN ASSISTANT

## 2021-04-16 PROCEDURE — 36415 COLL VENOUS BLD VENIPUNCTURE: CPT | Performed by: PHYSICIAN ASSISTANT

## 2021-04-16 PROCEDURE — 99285 EMERGENCY DEPT VISIT HI MDM: CPT

## 2021-04-16 PROCEDURE — NC001 PR NO CHARGE: Performed by: PHYSICIAN ASSISTANT

## 2021-04-16 PROCEDURE — 84484 ASSAY OF TROPONIN QUANT: CPT | Performed by: PHYSICIAN ASSISTANT

## 2021-04-16 PROCEDURE — 93010 ELECTROCARDIOGRAM REPORT: CPT | Performed by: INTERNAL MEDICINE

## 2021-04-16 RX ORDER — AMLODIPINE BESYLATE 5 MG/1
5 TABLET ORAL DAILY
Status: DISCONTINUED | OUTPATIENT
Start: 2021-04-17 | End: 2021-04-19 | Stop reason: HOSPADM

## 2021-04-16 RX ORDER — ONDANSETRON 2 MG/ML
4 INJECTION INTRAMUSCULAR; INTRAVENOUS ONCE
Status: COMPLETED | OUTPATIENT
Start: 2021-04-16 | End: 2021-04-16

## 2021-04-16 RX ORDER — OXYCODONE HYDROCHLORIDE 5 MG/1
5 TABLET ORAL EVERY 4 HOURS PRN
Status: DISCONTINUED | OUTPATIENT
Start: 2021-04-16 | End: 2021-04-19 | Stop reason: HOSPADM

## 2021-04-16 RX ORDER — POTASSIUM CHLORIDE 20 MEQ/1
40 TABLET, EXTENDED RELEASE ORAL ONCE
Status: COMPLETED | OUTPATIENT
Start: 2021-04-16 | End: 2021-04-16

## 2021-04-16 RX ORDER — INSULIN GLARGINE 100 [IU]/ML
9 INJECTION, SOLUTION SUBCUTANEOUS
Status: DISCONTINUED | OUTPATIENT
Start: 2021-04-16 | End: 2021-04-19 | Stop reason: HOSPADM

## 2021-04-16 RX ORDER — ONDANSETRON 2 MG/ML
4 INJECTION INTRAMUSCULAR; INTRAVENOUS EVERY 6 HOURS PRN
Status: DISCONTINUED | OUTPATIENT
Start: 2021-04-16 | End: 2021-04-19 | Stop reason: HOSPADM

## 2021-04-16 RX ORDER — OXYCODONE HYDROCHLORIDE 5 MG/1
2.5 TABLET ORAL EVERY 4 HOURS PRN
Status: DISCONTINUED | OUTPATIENT
Start: 2021-04-16 | End: 2021-04-19 | Stop reason: HOSPADM

## 2021-04-16 RX ORDER — PRAVASTATIN SODIUM 40 MG
40 TABLET ORAL
Status: DISCONTINUED | OUTPATIENT
Start: 2021-04-16 | End: 2021-04-19 | Stop reason: HOSPADM

## 2021-04-16 RX ORDER — CEPHALEXIN 500 MG/1
500 CAPSULE ORAL 2 TIMES DAILY
Qty: 14 CAPSULE | Refills: 0 | Status: SHIPPED | OUTPATIENT
Start: 2021-04-16 | End: 2021-04-23

## 2021-04-16 RX ORDER — LEVETIRACETAM 500 MG/1
500 TABLET ORAL EVERY 12 HOURS SCHEDULED
Status: DISCONTINUED | OUTPATIENT
Start: 2021-04-16 | End: 2021-04-19 | Stop reason: HOSPADM

## 2021-04-16 RX ORDER — ACETAMINOPHEN 325 MG/1
975 TABLET ORAL EVERY 8 HOURS SCHEDULED
Status: DISCONTINUED | OUTPATIENT
Start: 2021-04-16 | End: 2021-04-19 | Stop reason: HOSPADM

## 2021-04-16 RX ORDER — SENNOSIDES 8.6 MG
2 TABLET ORAL DAILY
Status: DISCONTINUED | OUTPATIENT
Start: 2021-04-17 | End: 2021-04-17

## 2021-04-16 RX ADMIN — INSULIN GLARGINE 9 UNITS: 100 INJECTION, SOLUTION SUBCUTANEOUS at 23:00

## 2021-04-16 RX ADMIN — POTASSIUM CHLORIDE 40 MEQ: 1500 TABLET, EXTENDED RELEASE ORAL at 20:59

## 2021-04-16 RX ADMIN — PRAVASTATIN SODIUM 40 MG: 40 TABLET ORAL at 21:00

## 2021-04-16 RX ADMIN — POTASSIUM CHLORIDE 40 MEQ: 1500 TABLET, EXTENDED RELEASE ORAL at 10:28

## 2021-04-16 RX ADMIN — LEVETIRACETAM 500 MG: 500 TABLET, FILM COATED ORAL at 20:59

## 2021-04-16 RX ADMIN — DESMOPRESSIN ACETATE 18.8 MCG: 4 SOLUTION INTRAVENOUS at 14:49

## 2021-04-16 RX ADMIN — ONDANSETRON 4 MG: 2 INJECTION INTRAMUSCULAR; INTRAVENOUS at 12:20

## 2021-04-16 NOTE — ASSESSMENT & PLAN NOTE
Lab Results   Component Value Date    HGBA1C 7 6 (H) 11/27/2019     - Initiate subcutaneous insulin regimen     - Resume home medication therapy on discharge  - Outpatient follow-up with PCP

## 2021-04-16 NOTE — H&P
Alejandro 10/27/1928, 80 y o  female MRN: [id number]  Unit/Bed#: ED 14 Encounter: [id number]  Primary Care Provider: No primary care provider on file  Date and time admitted to hospital: 4/16/2021  3:43 PM    Fall  Assessment & Plan  - Status post fall with the below noted injuries  - Fall precautions  - Geriatric Medicine consultation for evaluation, medication review and recommendations   - PT and OT evaluation and treatment as indicated  - Case Management consultation for disposition planning  Closed head injury  Assessment & Plan  - Closed head injury with small extra-axial injury concerning for and traumatic brain injury/bleed following fall  - Admit to trauma service with HOT protocol   - Neurosurgery evaluation and recommendations appreciated  - Hold anti-platelet and anticoagulant medications for least 2 weeks and/or until cleared by Neurosurgery  Patient was given DDAVP prior to transfer  - Continue Keppra for 7 days for seizure prophylaxis  - May continue Meclizine for dizziness  - Monitor neurologic exam   - Continue symptomatic management with analgesia as needed  - PT and OT evaluation and treatment as indicated  - Outpatient Neurosurgery follow-up in 2 weeks with repeat CT scan of the head prior to follow-up appointment  Scalp laceration, initial encounter  Assessment & Plan  - Left posterior scalp laceration not requiring intervention     - Local wound care as indicated  - Analgesia as needed  - Update tetanus vaccine  Meningioma [facility])  Assessment & Plan  - Chronic meningiomas again identified on CT scans prior to transfer today  - Patient follows with Neurosurgery as an outpatient  Await Neurosurgery evaluation and recommendations  Benign essential hypertension  Assessment & Plan  - Continue home medication regimen     - Outpatient follow-up with PCP      DMII (diabetes mellitus, type 2) ([facility])  Assessment & Plan    Lab Results   Component Value Date    HGBA1C 7 6 (H) 11/27/2019     - Initiate subcutaneous insulin regimen     - Resume home medication therapy on discharge  - Outpatient follow-up with PCP  Hypokalemia  Assessment & Plan  - Hypokalemia, present on admission     - Replete as indicated  - Repeat BMP on 04/17/2021     - Outpatient follow-up with PCP  H&P Exam - Trauma   Mi Umana 80 y o  female MRN: [id number]  Unit/Bed#: ED 14 Encounter: [id number]    Assessment/Plan   Trauma Alert: Evaluation; transfer from [address] [place] [address] Extension: Ambulance  Trauma Team: Attending Dr Haskel Bamberger and Palmer Steward PA-C  Consultants: Neurosurgery: I spoke with Marsha Kamara PA-C  Time Called I spoke with Marsha Kamara in person at 17:00, Returned call: Yes We spoke in person    Trauma Active Problems and Trauma Plan: See above  Chief Complaint:  "I feel fine "    History of Present Illness   HPI:  Mi Umana is a 80 y o  female who presents As a transfer from [facility]  She initially presented there following a fall this morning during which she struck her head  She denied losing consciousness  She does admit to taking Plavix daily  Her only complaint on presentation initially was a headache from a cut she obtained on the back of her head  Her initial workup was negative for acute traumatic injuries aside from her scalp laceration  Prior to discharge from the emergency department at [facility], the patient developed some dizziness and vomited prompting further workup and a repeat CT scan of the head revealing concern for traumatic brain injury prompting her transfer to [facility]  At this time, she states she feels well other than just a minimal headache and discomfort at the site of her left scalp laceration  She offers no other complaints at this time    She notes that she has eaten since her episode of emesis earlier today with no further nausea or dizziness  Mechanism:Fall    Review of Systems   Constitutional: Negative  Negative for activity change, appetite change, chills, diaphoresis, fatigue and fever  HENT: Negative  Negative for congestion, ear pain, facial swelling and sore throat  Eyes: Negative  Negative for photophobia, pain and visual disturbance  Respiratory: Negative  Negative for cough, chest tightness, shortness of breath and wheezing  Cardiovascular: Negative  Negative for chest pain and leg swelling  Gastrointestinal: Negative  Negative for abdominal distention, abdominal pain, constipation, diarrhea, nausea and vomiting  Endocrine: Negative  Genitourinary: Negative  Negative for difficulty urinating, dysuria, flank pain, frequency and hematuria  Musculoskeletal: Negative  Negative for arthralgias, back pain, myalgias and neck pain  Skin: Positive for wound (Posterior scalp laceration)  Negative for color change, pallor and rash  Allergic/Immunologic: Negative  Neurological: Positive for headaches  Negative for dizziness, syncope, weakness and light-headedness  Hematological: Negative  Psychiatric/Behavioral: Negative  Negative for agitation, confusion, hallucinations, self-injury and sleep disturbance  The patient is not nervous/anxious  All other systems reviewed and are negative  12-point, complete review of systems was reviewed and negative except as stated above         Historical Information   Efforts to obtain history included the following sources: family member, other medical personnel, obtained from other records    Past Medical History:   Diagnosis Date    Cognitive decline     Diabetes mellitus ([facility] [address] )     Dyslipidemia     History of CVA (cerebrovascular accident)     Hypertension     Meningioma ([address] )      Past Surgical History:   Procedure Laterality Date    APPENDECTOMY      HYSTERECTOMY      HYSTERECTOMY      THYROIDECTOMY      NEAR-TOTAL    TONSILLECTOMY AND ADENOIDECTOMY      TONSILLECTOMY AND ADENOIDECTOMY       Social History   Social History     Substance and Sexual Activity   Alcohol Use No    Comment: (HISTORY)     Social History     Substance and Sexual Activity   Drug Use No     Social History     Tobacco Use   Smoking Status Never Smoker   Smokeless Tobacco Never Used     E-Cigarette/Vaping    E-Cigarette Use Never User      E-Cigarette/Vaping Substances     Immunization History   Administered Date(s) Administered    INFLUENZA 02/22/2017, 12/23/2017, 09/13/2018    Influenza Split High Dose Preservative Free IM 09/28/2012, 10/31/2013, 10/27/2014, 11/30/2015, 02/22/2017, 12/23/2017    Influenza, seasonal, injectable 09/01/1999, 10/11/2000, 10/24/2001, 10/10/2003, 11/15/2004, 12/13/2005, 11/01/2006, 10/01/2007, 10/01/2008, 09/01/2010, 08/01/2011, 10/31/2013    Pneumococcal Conjugate 13-Valent 08/01/2016    Pneumococcal Polysaccharide PPV23 10/01/1999, 10/01/1999    influenza, trivalent, adjuvanted 09/13/2018     Last Tetanus: unknown  Family History: Non-contributory  Unable to obtain/limited by N/A      Meds/Allergies   all current active meds have been reviewed and current meds:   Current Facility-Administered Medications   Medication Dose Route Frequency    acetaminophen (TYLENOL) tablet 975 mg  975 mg Oral Q8H [address]    [START ON 4/17/2021] amLODIPine (NORVASC) tablet 5 mg  5 mg Oral Daily    insulin glargine (LANTUS) subcutaneous injection 9 Units 0 09 mL  9 Units Subcutaneous HS    insulin lispro (HumaLOG) 100 units/mL subcutaneous injection 1-5 Units  1-5 Units Subcutaneous HS    insulin lispro (HumaLOG) 100 units/mL subcutaneous injection 1-5 Units  1-5 Units Subcutaneous TID AC    [START ON 4/17/2021] insulin lispro (HumaLOG) 100 units/mL subcutaneous injection 3 Units  3 Units Subcutaneous Daily With Breakfast    [START ON 4/17/2021] insulin lispro (HumaLOG) 100 units/mL subcutaneous injection 3 Units  3 Units Subcutaneous Daily With Lunch  insulin lispro (HumaLOG) 100 units/mL subcutaneous injection 3 Units  3 Units Subcutaneous Daily With Dinner    levETIRAcetam (KEPPRA) tablet 500 mg  500 mg Oral Q12H [facility]    [START ON 4/17/2021] losartan potassium-hydrochlorothiazide (HYZAAR 50/12  5) combination   Oral Daily    naloxone (NARCAN) 0 04 mg/mL syringe 0 04 mg  0 04 mg Intravenous Q1MIN PRN    ondansetron (ZOFRAN) injection 4 mg  4 mg Intravenous Q6H PRN    oxyCODONE (ROXICODONE) IR tablet 2 5 mg  2 5 mg Oral Q4H PRN    oxyCODONE (ROXICODONE) IR tablet 5 mg  5 mg Oral Q4H PRN    pravastatin (PRAVACHOL) tablet 40 mg  40 mg Oral Daily With Dinner    [START ON 4/17/2021] senna (SENOKOT) tablet 17 2 mg  2 tablet Oral Daily       Allergies   Allergen Reactions    Aspartame - Food Allergy Other (See Comments)     diarrhea         PHYSICAL EXAM    PE limited by: N/A    Objective   Vitals:   First set: Temperature: 98 8 °F (37 1 °C) (04/16/21 1553)  Pulse: 89 (04/16/21 1553)  Respirations: 18 (04/16/21 1553)  Blood Pressure: 144/83 (04/16/21 1553)    Primary Survey:   (A) Airway:  Patent and intact  (B) Breathing:  Clear and equal bilaterally  (C) Circulation: Pulses:   normal, carotid  2/4, pedal  2/4, radial  2/4 and femoral  2/4  (D) Disabliity:  GCS Total:  15, Eye Opening:   Spontaneous = 4, Motor Response: Obeys commands = 6 and Verbal Response:  Oriented = 5  (E) Expose:  Completed    Secondary Survey: (Click on Physical Exam tab above)  Physical Exam  Vitals signs and nursing note reviewed  Constitutional:       General: She is awake  She is not in acute distress  Appearance: Normal appearance  She is not ill-appearing, toxic-appearing or diaphoretic  HENT:      Head: Normocephalic  Laceration (Left posterior scalp laceration with associated small hematoma and mild tenderness) present  No abrasion          Right Ear: Hearing, tympanic membrane, ear canal and external ear normal       Left Ear: Hearing, tympanic membrane, ear canal and external ear normal       Nose: Nose normal  No nasal deformity, septal deviation, signs of injury, laceration or nasal tenderness  Mouth/Throat:      Mouth: Mucous membranes are moist  No injury  Pharynx: Oropharynx is clear  Eyes:      Extraocular Movements: Extraocular movements intact  Conjunctiva/sclera: Conjunctivae normal       Pupils: Pupils are equal, round, and reactive to light  Comments:   Pupils were 3 mm, equal, round and reactive bilaterally   Neck:      Musculoskeletal: Normal range of motion and neck supple  No spinous process tenderness or muscular tenderness  Trachea: Trachea normal    Cardiovascular:      Rate and Rhythm: Normal rate and regular rhythm  Pulses: Normal pulses  Carotid pulses are 2+ on the right side and 2+ on the left side  Radial pulses are 2+ on the right side and 2+ on the left side  Femoral pulses are 2+ on the right side and 2+ on the left side  Dorsalis pedis pulses are 2+ on the right side and 2+ on the left side  Heart sounds: Normal heart sounds  No murmur  No friction rub  No gallop  Pulmonary:      Effort: Pulmonary effort is normal  No tachypnea, bradypnea, accessory muscle usage or respiratory distress  Breath sounds: Normal breath sounds  No stridor, decreased air movement or transmitted upper airway sounds  No decreased breath sounds, wheezing, rhonchi or rales  Chest:      Chest wall: No deformity, swelling, tenderness or crepitus  Abdominal:      General: Abdomen is flat  Bowel sounds are normal  There is no distension  Palpations: Abdomen is soft  Tenderness: There is no abdominal tenderness  There is no guarding or rebound  Musculoskeletal: Normal range of motion  General: No swelling, tenderness, deformity or signs of injury  Cervical back: She exhibits no tenderness, no deformity and no pain        Thoracic back: She exhibits no tenderness, no deformity and no pain       Lumbar back: She exhibits no tenderness, no deformity and no pain  Right lower leg: No edema  Left lower leg: No edema  Comments:  Normal range of motion all 4 extremities without pain, tenderness or deformity  Skin:     General: Skin is warm and dry  Capillary Refill: Capillary refill takes less than 2 seconds  Coloration: Skin is not jaundiced or pale  Findings: Laceration (Scalp laceration as noted) present  No bruising, ecchymosis, erythema, lesion or rash  Neurological:      General: No focal deficit present  Mental Status: She is alert and oriented to person, place, and time  Mental status is at baseline  GCS: GCS eye subscore is 4  GCS verbal subscore is 5  GCS motor subscore is 6  Sensory: No sensory deficit  Motor: No weakness  Psychiatric:         Mood and Affect: Mood normal          Behavior: Behavior is cooperative  Invasive Devices     Peripheral Intravenous Line            Peripheral IV 04/16/21 Right Antecubital less than 1 day                Lab Results:   Results: I have personally reviewed pertinent reports   , BMP/CMP:   Lab Results   Component Value Date    SODIUM 142 04/16/2021    K 3 2 (L) 04/16/2021     04/16/2021    CO2 30 04/16/2021    BUN 18 04/16/2021    CREATININE 0 59 (L) 04/16/2021    CALCIUM 9 2 04/16/2021    EGFR 80 04/16/2021   , CBC:   Lab Results   Component Value Date    WBC 7 15 04/16/2021    HGB 12 6 04/16/2021    HCT 38 7 04/16/2021    MCV 95 04/16/2021     04/16/2021    MCH 31 0 04/16/2021    MCHC 32 6 04/16/2021    RDW 12 7 04/16/2021    MPV 10 2 04/16/2021    NRBC 0 04/16/2021    and Coagulation: No results found for: PT, INR, APTT  Imaging/EKG Studies: Results: I have personally reviewed pertinent reports     and I have personally reviewed pertinent films in PACS  Other Studies: N/A    Code Status: Level 3 - DNAR and DNI  Advance Directive and Living Will:      Power of : POLST:

## 2021-04-16 NOTE — DISCHARGE INSTRUCTIONS
DISCHARGE INSTRUCTIONS:    FOLLOW UP WITH YOUR PRIMARY CARE PROVIDER OR THE [address] [place] [address]  MAKE AN APPOINTMENT TO BE SEEN  TAKE KEFLEX AS PRESCRIBED  IF RASH, SHORTNESS OF BREATH OR TROUBLE SWALLOWING, STOP TAKING THE MEDICATION AND BE SEEN  TAKE TYLENOL FOR PAIN  APPLY ICE TO HEAD  REST  IF SYMPTOMS WORSEN OR NEW SYMPTOMS ARISE, RETURN TO THE ER TO BE SEEN

## 2021-04-16 NOTE — EMTALA/ACUTE CARE TRANSFER
[facility] [address]  [address] [address]  Dept: [phone]      EMTALA TRANSFER CONSENT    NAME Candelaria Mckenna                                         10/27/1928                              MRN [id number]    I have been informed of my rights regarding examination, treatment, and transfer   by Dr Grande att  providers found    Benefits: Specialized equipment and/or services available at the receiving facility (Include comment)________________________(Trauma)    Risks: Potential for delay in receiving treatment, Increased discomfort during transfer, Possible worsening of condition or death during transfer      Consent for Transfer:  I acknowledge that my medical condition has been evaluated and explained to me by the emergency department physician or other qualified medical person and/or my attending physician, who has recommended that I be transferred to the service of  Accepting Physician: Dr David Saldivar at [address] Name, [address] : [facility]  The above potential benefits of such transfer, the potential risks associated with such transfer, and the probable risks of not being transferred have been explained to me, and I fully understand them  The doctor has explained that, in my case, the benefits of transfer outweigh the risks  I agree to be transferred  I authorize the performance of emergency medical procedures and treatments upon me in both transit and upon arrival at the receiving facility  Additionally, I authorize the release of any and all medical records to the receiving facility and request they be transported with me, if possible  I understand that the safest mode of transportation during a medical emergency is an ambulance and that the Hospital advocates the use of this mode of transport   Risks of traveling to the receiving facility by car, including absence of medical control, life sustaining equipment, such as oxygen, and medical personnel has been explained to me and I fully understand them  (JEROD CORRECT BOX BELOW)  [  ]  I consent to the stated transfer and to be transported by ambulance/helicopter  [  ]  I consent to the stated transfer, but refuse transportation by ambulance and accept full responsibility for my transportation by car  I understand the risks of non-ambulance transfers and I exonerate the Hospital and its staff from any deterioration in my condition that results from this refusal     X___________________________________________    DATE  21  TIME________  Signature of patient or legally responsible individual signing on patient behalf           RELATIONSHIP TO PATIENT_________________________          Provider Certification    NAME Ward Carnes                                         10/27/1928                              MRN [id number]    A medical screening exam was performed on the above named patient  Based on the examination:    Condition Necessitating Transfer The primary encounter diagnosis was Intracranial hemorrhage ([address] )  Diagnoses of Contusion of scalp, initial encounter, Acute head injury without loss of consciousness, initial encounter, UTI (urinary tract infection), Hypokalemia, Thyroid nodule, and Meningioma ([address] ) were also pertinent to this visit      Patient Condition: The patient has been stabilized such that within reasonable medical probability, no material deterioration of the patient condition or the condition of the unborn child(rocky) is likely to result from the transfer    Reason for Transfer: Level of Care needed not available at this facility    Transfer Requirements: Facility [facility]   · Space available and qualified personnel available for treatment as acknowledged by PACS  · Agreed to accept transfer and to provide appropriate medical treatment as acknowledged by       Dr Enola Schaumann  · Appropriate medical records of the examination and treatment of the patient are provided at the time of transfer   STAFF INITIAL WHEN COMPLETED _______  · Transfer will be performed by qualified personnel from    and appropriate transfer equipment as required, including the use of necessary and appropriate life support measures  Provider Certification: I have examined the patient and explained the following risks and benefits of being transferred/refusing transfer to the patient/family:         Based on these reasonable risks and benefits to the patient and/or the unborn child(rocky), and based upon the information available at the time of the patients examination, I certify that the medical benefits reasonably to be expected from the provision of appropriate medical treatments at another medical facility outweigh the increasing risks, if any, to the individuals medical condition, and in the case of labor to the unborn child, from effecting the transfer      X____________________________________________ DATE 04/16/21        TIME_______      ORIGINAL - SEND TO MEDICAL RECORDS   COPY - SEND WITH PATIENT DURING TRANSFER

## 2021-04-16 NOTE — ASSESSMENT & PLAN NOTE
- Closed head injury with small extra-axial injury concerning for and traumatic brain injury/bleed following fall  - Admit to trauma service with HOT protocol   - Neurosurgery evaluation and recommendations appreciated  - Hold anti-platelet and anticoagulant medications for least 2 weeks and/or until cleared by Neurosurgery  Patient was given DDAVP prior to transfer  - Continue Keppra for 7 days for seizure prophylaxis  - May continue Meclizine for dizziness  - Monitor neurologic exam   - Continue symptomatic management with analgesia as needed  - PT and OT evaluation and treatment as indicated  - Outpatient Neurosurgery follow-up in 2 weeks with repeat CT scan of the head prior to follow-up appointment

## 2021-04-16 NOTE — ED NOTES
[facility] aware of Pt's current admit status  [facility] transport has been cancelled at this time per RN  RN made aware of cancellation        Tim Read  04/16/21 2186

## 2021-04-16 NOTE — ASSESSMENT & PLAN NOTE
- Hypokalemia, present on admission     - Replete as indicated  - Repeat BMP on 04/17/2021     - Outpatient follow-up with PCP

## 2021-04-16 NOTE — TELEMEDICINE
On-Call Telephone Note    Contacted by Laquita Guillendomiinc Felton 80 y o  female MRN: [id number]  Unit/Bed#: ED 08 Encounter: [id number]    Per provider report, patient has a PMH signficant for DM, HEN, HLD, known meningiomas who presents s/p fall turning to get her walker  She takes plavix  She denies LOC  She follows with [facility] for her meningiomas  Available past medical history,social history, surgical history, medication list, drug allergies and review of systems were reviewed  BP (!) 174/83 (BP Location: Left arm)   Pulse 61   Temp 97 7 °F (36 5 °C) (Oral)   Resp 18   SpO2 99%      Clinical exam per provider report, patient is neuro intact  No weakness or sensory deficits  [address]      Imaging personally reviewed  CT head w/o, 4/16/21: Multiple extra-axial soft tissue masses, several of which are calcified, consistent with multiple meningioma  Largest lesion is in the posterior fossa on the right  The lesion has increased in size from the prior outside reports  There is mass effect on the 4th ventricle with a mild amount of surrounding vasogenic edema  Assessment and Plan  1  No current indication for surgical intervention  2  As patient is currently nonfocal and neuro intact, can follow up in the outpatient setting  3  She usually follows with [facility], patient can follow with them upon discharge for further evaluation and consideration of MRI  4  Our office information will be provided in the discharge section in case she would like another opinion with our practice     All questions answered  Provider is in agreement with the course of action  A total of 10 minutes was spent discussing the presentation, physical exam and formulating a management plan with the provider

## 2021-04-16 NOTE — ED NOTES
Dar De Souza from [facility] contacted   Dar De Souza will call back with update when he has transport available     Betsy Cheney, RN  04/16/21 5978

## 2021-04-16 NOTE — ED NOTES
Pt calling out in room  Pt reporting that she vomited and is having dizziness  Yellow bile liquid present on patient gown and mask  Pt reports lying is making her dizziness worse  Pamela MEDINA made aware        Sharon Khanna RN  04/16/21 1225

## 2021-04-16 NOTE — ED PROVIDER NOTES
History  Chief Complaint   Patient presents with    Fall     Patient arrives via ems for a fall from standing  patient states she was opening door and fell, patient struck head on concrete floor  laceration of left posterior  patient on plavix      89y  o female with PMH of DM, HTN, HLD, meningioma and osteoporosis presents to the ER for evaluation after a fall occurring this morning  Patient states she opened her door and then turned to get her walker and fell over  She denies LOC  She does take Plavix daily  Her only complaint is headache from a laceration she obtained  She denies taking any medication for pain  She describes her pain as aching and non-radiating  Symptoms are constant  She denies fever, chills, vision changes, URI symptoms, chest pain, dyspnea, N/V/D, abdominal pain, neck pain, back pain, weakness or paresthesias  History provided by:  Patient   used: No        Prior to Admission Medications   Prescriptions Last Dose Informant Patient Reported? Taking?    Cholecalciferol 1000 units capsule  Self Yes No   Sig: Take 1,000 Units by mouth   Insulin Syringe-Needle U-100 (B-D INS SYR ULTRAFINE  3CC/31G) 31G X 5/16" 0 3 ML MISC  Self Yes No   Sig: by Does not apply route   LANTUS 100 UNIT/ML subcutaneous injection  Self No No   Sig: INJECT UNDER THE SKIN AS DIRECTED   Multiple Vitamins-Minerals (MULTIVITAMIN ADULT PO)  Self Yes No   Sig: Take 1 capsule by mouth   Omega-3 Fatty Acids (FISH OIL PO)  Self Yes No   Sig: Take 2 g by mouth   amLODIPine (NORVASC) 5 mg tablet   No No   Sig: TAKE 1 TABLET DAILY   clopidogrel (PLAVIX) 75 mg tablet   No No   Sig: TAKE 1 TABLET DAILY   glucose blood (GUERRERO CONTOUR NEXT TEST) test strip  Self Yes No   Sig: by In Vitro route 2 (two) times a day   losartan-hydrochlorothiazide (HYZAAR) 50-12 5 mg per tablet   No No   Sig: TAKE ONE AND ONE-HALF TABLETS DAILY   metFORMIN (GLUCOPHAGE) 1000 MG tablet   No No   Sig: TAKE 1 TABLET DAILY   simvastatin (ZOCOR) 20 mg tablet  Self No No   Sig: TAKE 1 TABLET AT BEDTIME      Facility-Administered Medications: None       History reviewed  No pertinent past medical history  Past Surgical History:   Procedure Laterality Date    APPENDECTOMY      HYSTERECTOMY      HYSTERECTOMY      THYROIDECTOMY      NEAR-TOTAL    TONSILLECTOMY AND ADENOIDECTOMY      TONSILLECTOMY AND ADENOIDECTOMY         Family History   Problem Relation Age of Onset    Diabetes Mother     Other Mother         LIVER TUMOR    Cancer Family     Diabetes Family     Hypertension Family      I have reviewed and agree with the history as documented  E-Cigarette/Vaping     E-Cigarette/Vaping Substances     Social History     Tobacco Use    Smoking status: Never Smoker    Smokeless tobacco: Never Used   Substance Use Topics    Alcohol use: No     Comment: (HISTORY)    Drug use: No       Review of Systems   Constitutional: Negative for activity change, appetite change, chills and fever  HENT: Negative for congestion, drooling, ear discharge, ear pain, facial swelling, rhinorrhea and sore throat  Eyes: Negative for photophobia, redness and visual disturbance  Respiratory: Negative for cough and shortness of breath  Cardiovascular: Negative for chest pain  Gastrointestinal: Negative for abdominal pain, diarrhea, nausea and vomiting  Musculoskeletal: Negative for back pain, neck pain and neck stiffness  Skin: Positive for wound (scalp laceration)  Negative for rash  Allergic/Immunologic: Positive for food allergies  Neurological: Positive for headaches  Negative for weakness and numbness  Physical Exam  Physical Exam  Vitals signs and nursing note reviewed  Constitutional:       General: She is not in acute distress  Appearance: She is not toxic-appearing  HENT:      Head: Normocephalic  Abrasion and contusion present  No raccoon eyes, Lanier's sign, right periorbital erythema or left periorbital erythema  Right Ear: Tympanic membrane, ear canal and external ear normal  No drainage, swelling or tenderness  No foreign body  No hemotympanum  Tympanic membrane is not erythematous  Left Ear: Tympanic membrane, ear canal and external ear normal  No drainage, swelling or tenderness  No foreign body  No hemotympanum  Tympanic membrane is not erythematous  Nose: Nose normal       Mouth/Throat:      Lips: Pink  No lesions  Mouth: Mucous membranes are moist       Pharynx: Uvula midline  No posterior oropharyngeal erythema or uvula swelling  Tonsils: No tonsillar exudate or tonsillar abscesses  Eyes:      Extraocular Movements: Extraocular movements intact  Conjunctiva/sclera: Conjunctivae normal       Pupils: Pupils are equal, round, and reactive to light  Neck:      Musculoskeletal: Normal range of motion and neck supple  Trachea: Phonation normal  No tracheal deviation  Cardiovascular:      Rate and Rhythm: Normal rate and regular rhythm  Heart sounds: Normal heart sounds, S1 normal and S2 normal  No murmur  No friction rub  No gallop  Pulmonary:      Effort: Pulmonary effort is normal  No respiratory distress  Breath sounds: Normal breath sounds  No decreased breath sounds, wheezing, rhonchi or rales  Chest:      Chest wall: No tenderness  Abdominal:      General: Bowel sounds are normal  There is no distension  Palpations: Abdomen is soft  Tenderness: There is no abdominal tenderness  There is no guarding or rebound  Musculoskeletal: Normal range of motion  General: No deformity        Right wrist: Normal       Left wrist: Normal       Right hip: Normal       Left hip: Normal       Right knee: Normal       Left knee: Normal       Right ankle: Normal       Left ankle: Normal       Cervical back: Normal       Thoracic back: Normal       Lumbar back: Normal       Right upper leg: Normal       Left upper leg: Normal       Right lower leg: Normal  Left lower leg: Normal    Skin:     General: Skin is warm and dry  Findings: No rash  Neurological:      Mental Status: She is alert  GCS: GCS eye subscore is 4  GCS verbal subscore is 5  GCS motor subscore is 6  Cranial Nerves: No cranial nerve deficit  Sensory: No sensory deficit  Motor: No abnormal muscle tone  Gait: Gait normal       Comments: Oriented to place and self but not time  Does know that the president is Zoraida  This is patient's baseline  Vital Signs  ED Triage Vitals   Temperature Pulse Respirations Blood Pressure SpO2   04/16/21 0625 04/16/21 0625 04/16/21 0625 04/16/21 0625 04/16/21 0625   97 7 °F (36 5 °C) 75 18 147/88 100 %      Temp Source Heart Rate Source Patient Position - Orthostatic VS BP Location FiO2 (%)   04/16/21 0625 04/16/21 0625 04/16/21 0625 04/16/21 0625 --   Oral Monitor Lying Left arm       Pain Score       04/16/21 1119       No Pain           Vitals:    04/16/21 0625 04/16/21 0858 04/16/21 1119 04/16/21 1419   BP: 147/88 (!) 174/83 (!) 177/84 150/67   Pulse: 75 61 72 70   Patient Position - Orthostatic VS: Lying Lying Lying Lying         Visual Acuity      ED Medications  Medications   potassium chloride (K-DUR,KLOR-CON) CR tablet 40 mEq (40 mEq Oral Given 4/16/21 1028)   ondansetron (ZOFRAN) injection 4 mg (4 mg Intravenous Given 4/16/21 1220)   desmopressin (DDAVP) 18 8 mcg in sodium chloride 0 9 % 50 mL IVPB (18 8 mcg Intravenous New Bag 4/16/21 1449)       Diagnostic Studies  Results Reviewed     Procedure Component Value Units Date/Time    COVID19, Influenza A/B, RSV PCR, [facility] [[id number]] Collected: 04/16/21 1449    Lab Status:  In process Specimen: Nares from Nasopharyngeal Swab Updated: 04/16/21 1508    Troponin I [[id number]]  (Normal) Collected: 04/16/21 1220    Lab Status: Final result Specimen: Blood from Arm, Right Updated: 04/16/21 1244     Troponin I <0 02 ng/mL     Fingerstick Glucose (POCT) [[id number]]  (Normal) Collected: 04/16/21 1210    Lab Status: Final result Updated: 04/16/21 1218     POC Glucose 117 mg/dl     Urine Microscopic [[id number]]  (Abnormal) Collected: 04/16/21 0855    Lab Status: Final result Specimen: Urine, Clean Catch Updated: 04/16/21 0928     RBC, UA 0-1 /hpf      WBC, UA 4-10 /hpf      Epithelial Cells Occasional /hpf      Bacteria, UA Moderate /hpf     Urine Macroscopic, POC [[id number]]  (Abnormal) Collected: 04/16/21 0855    Lab Status: Final result Specimen: Urine Updated: 04/16/21 0856     Color, UA Yellow     Clarity, UA Slightly Cloudy     pH, UA 5 5     Leukocytes, UA Small     Nitrite, UA Positive     Protein, UA Negative mg/dl      Glucose, UA Negative mg/dl      Ketones, UA Negative mg/dl      Urobilinogen, UA 0 2 E U /dl      Bilirubin, UA Negative     Blood, UA Trace     Specific [place], UA 1 025    Narrative:      CLINITEK RESULT    Troponin I [[id number]]  (Normal) Collected: 04/16/21 0651    Lab Status: Final result Specimen: Blood from Arm, Right Updated: 04/16/21 0723     Troponin I <0 02 ng/mL     Basic metabolic panel [[id number]]  (Abnormal) Collected: 04/16/21 0651    Lab Status: Final result Specimen: Blood from Arm, Right Updated: 04/16/21 0719     Sodium 142 mmol/L      Potassium 3 2 mmol/L      Chloride 106 mmol/L      CO2 30 mmol/L      ANION GAP 6 mmol/L      BUN 18 mg/dL      Creatinine 0 59 mg/dL      Glucose 109 mg/dL      Calcium 9 2 mg/dL      eGFR 80 ml/min/1 73sq m     Narrative:      Shanthi guidelines for Chronic Kidney Disease (CKD):     Stage 1 with normal or high GFR (GFR > 90 mL/min/1 73 square meters)    Stage 2 Mild CKD (GFR = 60-89 mL/min/1 73 square meters)    Stage 3A Moderate CKD (GFR = 45-59 mL/min/1 73 square meters)    Stage 3B Moderate CKD (GFR = 30-44 mL/min/1 73 square meters)    Stage 4 Severe CKD (GFR = 15-29 mL/min/1 73 square meters)    Stage 5 End Stage CKD (GFR <15 mL/min/1 73 square meters)  Note: GFR calculation is accurate only with a steady state creatinine    CBC and differential [[id number]] Collected: 04/16/21 0651    Lab Status: Final result Specimen: Blood from Arm, Right Updated: 04/16/21 0704     WBC 7 15 Thousand/uL      RBC 4 06 Million/uL      Hemoglobin 12 6 g/dL      Hematocrit 38 7 %      MCV 95 fL      MCH 31 0 pg      MCHC 32 6 g/dL      RDW 12 7 %      MPV 10 2 fL      Platelets 704 Thousands/uL      nRBC 0 /100 WBCs      Neutrophils Relative 69 %      Immat GRANS % 0 %      Lymphocytes Relative 17 %      Monocytes Relative 10 %      Eosinophils Relative 3 %      Basophils Relative 1 %      Neutrophils Absolute 4 98 Thousands/µL      Immature Grans Absolute 0 02 Thousand/uL      Lymphocytes Absolute 1 19 Thousands/µL      Monocytes Absolute 0 69 Thousand/µL      Eosinophils Absolute 0 21 Thousand/µL      Basophils Absolute 0 06 Thousands/µL                  CT head without contrast   Final Result by Karey Brooks MD (04/16 1780)      1  Tiny, 4 mm, mixed density extra-axial hemorrhage in the left temporal lobe best seen on series 2 image 12 is new from same day earlier head CT  2   Large right posterior fossa lesion along the right transverse sinus and tentorium with subjacent right cerebellar vasogenic edema and mass effect upon brainstem  There is mild right tonsillar herniation  Effacement of right cerebellar and ambient    cisterns as well as 4th ventricle with slightly enlarged ventricular system  Findings are similar to same day earlier head CT  This is characterized as meningioma in prior outside MRI  No prior outside imaging is available in PACS for comparison  3   Additional smaller calcified extra-axial lesion most consistent with meningioma                              I personally discussed this study with Andrzej Mota on 4/16/2021 at 1:43 PM                      Workstation performed: [id number]         CT head without contrast   Final Result by Scotty Power DO (04/16 8740) 1   Cerebral atrophy with chronic small vessel ischemic white matter disease  No acute intracranial abnormality  2   Multiple extra-axial soft tissue masses, several of which are calcified, consistent with multiple meningioma  Largest lesion is in the posterior fossa on the right  The lesion has increased in size from the prior outside reports  There is mass    effect on the 4th ventricle with a mild amount of surrounding vasogenic edema  3   Soft tissue swelling overlying the left parietal convexity  Underlying bony calvarium intact  Recommend follow-up neurosurgical evaluation and/or MRI of the brain with intravenous contrast material                 I personally discussed this study with Sj Dodge on 4/16/2021 at 7:39 AM                Workstation performed: [id number]         CT cervical spine without contrast   Final Result by Ricky Noonan DO (04/16 2929)   Degenerative changes of the cervical spine  No acute cervical spine fracture or traumatic malalignment              I personally discussed this study with Sj Dodge on 4/16/2021 at 7:39 AM                    Workstation performed: [id number]                    Procedures  ECG 12 Lead Documentation Only    Date/Time: 4/16/2021 6:52 AM  Performed by: Uriel Ghotra PA-C  Authorized by: Uriel Ghotra PA-C     Indications / Diagnosis:  Fall  ECG reviewed by me, the ED Provider: yes (Also reviewed by Dr Romario Drake)    Patient location:  ED  Interpretation:     Interpretation: abnormal    Rate:     ECG rate:  67    ECG rate assessment: normal    Rhythm:     Rhythm: sinus rhythm    Ectopy:     Ectopy: none    QRS:     QRS intervals:  Normal  Conduction:     Conduction: normal    ST segments:     ST segments:  Normal  T waves:     T waves: inverted      Inverted:  AVR and V1  ECG 12 Lead Documentation Only    Date/Time: 4/16/2021 12:13 PM  Performed by: Uriel Ghotra PA-C  Authorized by: Uriel Ghotra PA-C Indications / Diagnosis:  Patient was waiting for transport and became dizzy and began vomiting  ECG reviewed by me, the ED Provider: yes (Also reviewed by Dr Shalonda Blackwood)    Patient location:  ED  Previous ECG:     Previous ECG:  Compared to current    Comparison ECG info:  T wave inverted in AVF  flatening t waves in V3-V6    Similarity:  Changes noted  Interpretation:     Interpretation: abnormal    Rate:     ECG rate:  84    ECG rate assessment: normal    Rhythm:     Rhythm: sinus rhythm    Ectopy:     Ectopy: none    QRS:     QRS intervals:  Normal  Conduction:     Conduction: normal    ST segments:     ST segments:  Normal  T waves:     T waves: inverted      Inverted:  III, aVR, aVF and V1  CriticalCare Time  Performed by: Ty Ennis PA-C  Authorized by: Ty Ennis PA-C     Critical care provider statement:     Critical care time (minutes):  40    Critical care time was exclusive of:  Separately billable procedures and treating other patients    Critical care was necessary to treat or prevent imminent or life-threatening deterioration of the following conditions:  CNS failure or compromise    Critical care was time spent personally by me on the following activities:  Obtaining history from patient or surrogate, development of treatment plan with patient or surrogate, discussions with consultants, evaluation of patient's response to treatment, examination of patient, ordering and performing treatments and interventions, ordering and review of laboratory studies, ordering and review of radiographic studies and re-evaluation of patient's condition             ED Course  ED Course as of Apr 16 1536   Fri Apr 16, 2021   1612 Spoke with Dr Ginna Márquez from Radiology in regards to CT head  CT shows large posterior fossa mass most likely meningioma  Mass effect on fourth ventricle  CT cervical spine is negative  [place] 76 text sent to neurosurgery in regards to patient's CT findings        1011 Will treat with antibiotics  Urine Macroscopic, POC(!)   1213 Patient now states she is dizzy and is vomiting  Will order glucose, troponin, EKG and repeat CT head to ensure no delayed bleeding  Prachi with radiology in regards to CT findings  Left temporal lobe 4mm area concerning for a bleed  Will need repeat imaging  Will call trauma  [address] from Dr Merry Cheema from Trauma  Will transfer to [facility] and start DDAVP  SBIRT 22yo+      Most Recent Value   SBIRT (22 yo +)   In order to provide better care to our patients, we are screening all of our patients for alcohol and drug use  Would it be okay to ask you these screening questions? Yes Filed at: 04/16/2021 0631   Initial Alcohol Screen: US AUDIT-C    1  How often do you have a drink containing alcohol?  0 Filed at: 04/16/2021 0631   2  How many drinks containing alcohol do you have on a typical day you are drinking? 0 Filed at: 04/16/2021 0631   3a  Male UNDER 65: How often do you have five or more drinks on one occasion? 0 Filed at: 04/16/2021 0631   3b  FEMALE Any Age, or MALE 65+: How often do you have 4 or more drinks on one occassion? 0 Filed at: 04/16/2021 0631   Audit-C Score  0 Filed at: 04/16/2021 0292   AAMIR: How many times in the past year have you    Used an illegal drug or used a prescription medication for non-medical reasons?   Never Filed at: 04/16/2021 0631                    MDM  Number of Diagnoses or Management Options  Acute head injury without loss of consciousness, initial encounter: new and requires workup  Contusion of scalp, initial encounter: new and requires workup  Hypokalemia: new and requires workup  Intracranial hemorrhage [facility]): new and requires workup  Meningioma [facility]): new and requires workup  Thyroid nodule: new and requires workup  UTI (urinary tract infection): new and requires workup  Diagnosis management comments: DDX consists of but not limited to: fracture, contusion, intracranial abnormality, electrolyte abnormality, infection    Will check labs and imaging  Steph 51 with Dr Willem Santacruz from Radiology in regards to CT head  CT shows large posterior fossa mass most likely meningioma  Mass effect on fourth ventricle  CT cervical spine is negative  [place] 76 text sent to neurosurgery in regards to patient's CT findings  1011 Will treat with antibiotics  - Urine Macroscopic, POC(!)    1100    Spoke with patient's daughter Ailin Centeno and informed her of lab and imaging findings  Will discharge patient and transport back home to where she lives  1213 Patient waiting for transport and now states she is dizzy and is vomiting  Will order glucose, troponin, EKG and repeat CT head to ensure no delayed bleeding  Prachi with radiology in regards to CT findings  Left temporal lobe 4mm area concerning for an intracranial bleed  Will need repeat imaging  Will call trauma for admission  [address] from Dr Hans Burton from Trauma  Will transfer to [facility] and start DDAVP  [address] with patient's daughter Ailin Centeno and informed her of new imaging findings and plan to transfer to [facility]  Ailin Angry agreeable  Patient also agreeable to this plan  Patient stable at time of transfer to [facility]             Amount and/or Complexity of Data Reviewed  Clinical lab tests: ordered and reviewed  Tests in the radiology section of CPT®: ordered and reviewed  Obtain history from someone other than the patient: yes  Discuss the patient with other providers: yes  Independent visualization of images, tracings, or specimens: yes    Patient Progress  Patient progress: stable      Disposition  Final diagnoses:   Contusion of scalp, initial encounter   Acute head injury without loss of consciousness, initial encounter   UTI (urinary tract infection)   Hypokalemia   Thyroid nodule   Meningioma ([facility] [address] )   Intracranial hemorrhage ([facility] [address] )     Time reflects when diagnosis was documented in both MDM as applicable and the Disposition within this note     Time User Action Codes Description Comment    4/16/2021 11:16 AM Williams Lek A Add [S00 03XA] Contusion of scalp, initial encounter     4/16/2021 11:16 AM Williams Lek A Add [S09 90XA] Acute head injury without loss of consciousness, initial encounter     4/16/2021 11:16 AM Williams Lek A Add [N39 0] UTI (urinary tract infection)     4/16/2021 11:16 AM Williams Lek A Add [E87 6] Hypokalemia     4/16/2021 11:16 AM Williams Lek A Add [E04 1] Thyroid nodule     4/16/2021 11:16 AM Williams Lek A Add [D32 9] Meningioma ([facility] [address] )     4/16/2021  2:23 PM Williams Lek A Add [I62 9] Intracranial hemorrhage ([facility] [address] )     4/16/2021  2:24 PM Williams Lek A Modify [S00 03XA] Contusion of scalp, initial encounter     4/16/2021  2:24 PM Williams Lek A Modify [I62 9] Intracranial hemorrhage [facility])       ED Disposition     ED Disposition Condition Date/Time Comment    Transfer to Another Facility-In Network  Fri Apr 16, 2021  2:23 PM Néstro Walker should be transferred out to [facility] - Trauma          MD Documentation      Most Recent Value   Patient Condition  The patient has been stabilized such that within reasonable medical probability, no material deterioration of the patient condition or the condition of the unborn child(rocky) is likely to result from the transfer   Reason for Transfer  Level of Care needed not available at this facility   Benefits of Transfer  Specialized equipment and/or services available at the receiving facility (Include comment)________________________ [Trauma]   Risks of Transfer  Potential for delay in receiving treatment, Increased discomfort during transfer, Possible worsening of condition or death during transfer   Accepting Physician  Dr Jade Vega Name, [address]   SLB    (Name & Tel number)  PACS   Sending Dr Kody Caldera [facility]      [address] [facility] Name, [address] [place] [address]  (Name & Tel number)  PACS      Follow-up Information     Follow up With Specialties Details Why [facility] Neurosurgery Follow up  [address] [facility] [place] [address] [phone]  [address], [address] [place], [place], [address] Family Medicine Schedule an appointment as soon as possible for a visit   [address], [address] [facility] [id number]  [address], [address], [address] [facility], [place], [place], [address]          Discharge Medication List as of 4/16/2021 11:18 AM      START taking these medications    Details   cephalexin (KEFLEX) 500 mg capsule Take 1 capsule (500 mg total) by mouth 2 (two) times a day for 7 days, Starting Fri 4/16/2021, Until Fri 4/23/2021, Normal         CONTINUE these medications which have NOT CHANGED    Details   amLODIPine (NORVASC) 5 mg tablet TAKE 1 TABLET DAILY, Normal      Cholecalciferol 1000 units capsule Take 1,000 Units by mouth, Historical Med      clopidogrel (PLAVIX) 75 mg tablet TAKE 1 TABLET DAILY, Normal      glucose blood (GUERRERO CONTOUR NEXT TEST) test strip by In Vitro route 2 (two) times a day, Starting Wed 8/8/2012, Historical Med      Insulin Syringe-Needle U-100 (B-D INS SYR ULTRAFINE  3CC/31G) 31G X 5/16" 0 3 ML MISC by Does not apply route, Starting Fri 9/28/2012, Historical Med      LANTUS 100 UNIT/ML subcutaneous injection INJECT UNDER THE SKIN AS DIRECTED, Normal      losartan-hydrochlorothiazide (HYZAAR) 50-12 5 mg per tablet TAKE ONE AND ONE-HALF TABLETS DAILY, Normal      metFORMIN (GLUCOPHAGE) 1000 MG tablet TAKE 1 TABLET DAILY, Normal      Multiple Vitamins-Minerals (MULTIVITAMIN ADULT PO) Take 1 capsule by mouth, Historical Med Omega-3 Fatty Acids (FISH OIL PO) Take 2 g by mouth, Historical Med      simvastatin (ZOCOR) 20 mg tablet TAKE 1 TABLET AT BEDTIME, Normal           No discharge procedures on file      PDMP Review     None          ED Provider  Electronically Signed by           Kentrell Damon PA-C  04/16/21 2590

## 2021-04-16 NOTE — ASSESSMENT & PLAN NOTE
- Chronic meningiomas again identified on CT scans prior to transfer today  - Patient follows with Neurosurgery as an outpatient  Await Neurosurgery evaluation and recommendations

## 2021-04-16 NOTE — ASSESSMENT & PLAN NOTE
- Status post fall with the below noted injuries  - Fall precautions  - Geriatric Medicine consultation for evaluation, medication review and recommendations   - PT and OT evaluation and treatment as indicated  - Case Management consultation for disposition planning

## 2021-04-16 NOTE — ASSESSMENT & PLAN NOTE
- Left posterior scalp laceration not requiring intervention     - Local wound care as indicated  - Analgesia as needed  - Update tetanus vaccine

## 2021-04-17 ENCOUNTER — APPOINTMENT (INPATIENT)
Dept: RADIOLOGY | Facility: HOSPITAL | Age: 86
DRG: 085 | End: 2021-04-17
Payer: MEDICARE

## 2021-04-17 LAB
ANION GAP SERPL CALCULATED.3IONS-SCNC: 6 MMOL/L (ref 4–13)
BUN SERPL-MCNC: 11 MG/DL (ref 5–25)
CALCIUM SERPL-MCNC: 9.2 MG/DL (ref 8.3–10.1)
CHLORIDE SERPL-SCNC: 106 MMOL/L (ref 100–108)
CO2 SERPL-SCNC: 28 MMOL/L (ref 21–32)
CREAT SERPL-MCNC: 0.52 MG/DL (ref 0.6–1.3)
GFR SERPL CREATININE-BSD FRML MDRD: 83 ML/MIN/1.73SQ M
GLUCOSE SERPL-MCNC: 120 MG/DL (ref 65–140)
GLUCOSE SERPL-MCNC: 126 MG/DL (ref 65–140)
GLUCOSE SERPL-MCNC: 129 MG/DL (ref 65–140)
GLUCOSE SERPL-MCNC: 147 MG/DL (ref 65–140)
GLUCOSE SERPL-MCNC: 153 MG/DL (ref 65–140)
GLUCOSE SERPL-MCNC: 74 MG/DL (ref 65–140)
MAGNESIUM SERPL-MCNC: 1.8 MG/DL (ref 1.6–2.6)
POTASSIUM SERPL-SCNC: 3.7 MMOL/L (ref 3.5–5.3)
SODIUM SERPL-SCNC: 140 MMOL/L (ref 136–145)

## 2021-04-17 PROCEDURE — 99222 1ST HOSP IP/OBS MODERATE 55: CPT | Performed by: PHYSICIAN ASSISTANT

## 2021-04-17 PROCEDURE — 99232 SBSQ HOSP IP/OBS MODERATE 35: CPT | Performed by: SURGERY

## 2021-04-17 PROCEDURE — 80048 BASIC METABOLIC PNL TOTAL CA: CPT | Performed by: PHYSICIAN ASSISTANT

## 2021-04-17 PROCEDURE — 82948 REAGENT STRIP/BLOOD GLUCOSE: CPT

## 2021-04-17 PROCEDURE — G1004 CDSM NDSC: HCPCS

## 2021-04-17 PROCEDURE — 70450 CT HEAD/BRAIN W/O DYE: CPT

## 2021-04-17 PROCEDURE — 97167 OT EVAL HIGH COMPLEX 60 MIN: CPT

## 2021-04-17 PROCEDURE — 83735 ASSAY OF MAGNESIUM: CPT | Performed by: PHYSICIAN ASSISTANT

## 2021-04-17 PROCEDURE — 97163 PT EVAL HIGH COMPLEX 45 MIN: CPT

## 2021-04-17 RX ORDER — QUETIAPINE FUMARATE 25 MG/1
25 TABLET, FILM COATED ORAL
Status: DISCONTINUED | OUTPATIENT
Start: 2021-04-17 | End: 2021-04-19 | Stop reason: HOSPADM

## 2021-04-17 RX ORDER — SENNOSIDES 8.6 MG
2 TABLET ORAL
Status: DISCONTINUED | OUTPATIENT
Start: 2021-04-17 | End: 2021-04-19 | Stop reason: HOSPADM

## 2021-04-17 RX ORDER — OLANZAPINE 10 MG/1
2.5 INJECTION, POWDER, LYOPHILIZED, FOR SOLUTION INTRAMUSCULAR ONCE
Status: COMPLETED | OUTPATIENT
Start: 2021-04-17 | End: 2021-04-17

## 2021-04-17 RX ADMIN — ACETAMINOPHEN 975 MG: 325 TABLET ORAL at 21:54

## 2021-04-17 RX ADMIN — PRAVASTATIN SODIUM 40 MG: 40 TABLET ORAL at 17:16

## 2021-04-17 RX ADMIN — INSULIN GLARGINE 9 UNITS: 100 INJECTION, SOLUTION SUBCUTANEOUS at 21:58

## 2021-04-17 RX ADMIN — QUETIAPINE FUMARATE 25 MG: 25 TABLET ORAL at 22:06

## 2021-04-17 RX ADMIN — WATER 10 ML: 1 INJECTION INTRAMUSCULAR; INTRAVENOUS; SUBCUTANEOUS at 18:24

## 2021-04-17 RX ADMIN — OLANZAPINE 2.5 MG: 10 INJECTION, POWDER, FOR SOLUTION INTRAMUSCULAR at 18:22

## 2021-04-17 RX ADMIN — LOSARTAN POTASSIUM: 50 TABLET, FILM COATED ORAL at 11:51

## 2021-04-17 RX ADMIN — AMLODIPINE BESYLATE 5 MG: 5 TABLET ORAL at 09:09

## 2021-04-17 RX ADMIN — LEVETIRACETAM 500 MG: 500 TABLET, FILM COATED ORAL at 21:54

## 2021-04-17 RX ADMIN — INSULIN LISPRO 3 UNITS: 100 INJECTION, SOLUTION INTRAVENOUS; SUBCUTANEOUS at 09:12

## 2021-04-17 RX ADMIN — LEVETIRACETAM 500 MG: 500 TABLET, FILM COATED ORAL at 09:09

## 2021-04-17 RX ADMIN — INSULIN LISPRO 1 UNITS: 100 INJECTION, SOLUTION INTRAVENOUS; SUBCUTANEOUS at 21:55

## 2021-04-17 NOTE — ASSESSMENT & PLAN NOTE
- Chronic meningiomas again identified on CT scans prior to transfer today    - Patient follows with [facility] Neurosurgery as an outpatient     - Neurosurgery recommends follow up outpatient with designated provider

## 2021-04-17 NOTE — OCCUPATIONAL THERAPY NOTE
Occupational Therapy Evaluation     Patient Name: Sirisha James  EWHBP'Q Date: 4/17/2021  Problem List  Principal Problem:    Closed head injury  Active Problems:    Benign essential hypertension    Meningioma (HCC)    DMII (diabetes mellitus, type 2) ([facility] )    Fall    Scalp laceration, initial encounter    Hypokalemia    Past Medical History  Past Medical History:   Diagnosis Date    Cognitive decline     Diabetes mellitus ([facility] )     Dyslipidemia     History of CVA (cerebrovascular accident)     Hypertension     Meningioma ([facility] )      Past Surgical History  Past Surgical History:   Procedure Laterality Date    APPENDECTOMY      HYSTERECTOMY      HYSTERECTOMY      THYROIDECTOMY      NEAR-TOTAL    TONSILLECTOMY AND ADENOIDECTOMY      TONSILLECTOMY AND ADENOIDECTOMY           04/17/21 1008   OT Last Visit   OT Visit Date 04/17/21   Note Type   Note type Evaluation   Restrictions/Precautions   Weight Bearing Precautions Per Order No   Other Precautions Cognitive; Chair Alarm; Bed Alarm;Multiple lines; Fall Risk;Pain   Pain Assessment   Pain Assessment Tool [facility] Pain Intervention(s) Repositioned; Ambulation/increased activity; Emotional support   Pain Rating: FLACC (Rest) - Face 0   Pain Rating: FLACC (Rest) - Legs 0   Pain Rating: FLACC (Rest) - Activity 0   Pain Rating: FLACC (Rest) - Cry 0   Pain Rating: FLACC (Rest) - Consolability 0   Score: FLACC (Rest) 0   Pain Rating: FLACC (Activity) - Face 1   Pain Rating: FLACC (Activity) - Legs 0   Pain Rating: FLACC (Activity) - Activity 0   Pain Rating: FLACC (Activity) - Cry 1   Pain Rating: FLACC (Activity) - Consolability 0   Score: FLACC (Activity) 2   Home Living   Home Equipment Walker  (ROLLATOR)   Additional Comments PT IS POOR HISTORIAN- UNKNOWN HOME SET-UP/PLOF HOWEVER PT BELIEVED TO BE FROM correction/SNF 2' PT REPORTING "ITS A HOME BUT NOT MY HOME" AND "I LIVE WITH MANY PEOPLE"   NEED TO CONFIRM    Prior Function   Level of [place] Needs assistance with IADLs; Needs assistance with ADLs and functional mobility   ADL Assistance Needs assistance   IADLs Needs assistance   Falls in the last 6 months   ("MANY")   Vocational Retired   Lifestyle   Autonomy PT Usha Lozano6 "[address]"  IT IS BELIEVED PT HAS ASSIST WITH IADLS  PT REPORTS USE OF ROLLATOR WITHOUT ASSISTANCE  NEED TO CONFIRM   Reciprocal Relationships UNKNOWN   Service to Others UNKNOWN   Intrinsic Gratification ENJOYS "WALKING AROUND"    Subjective   Subjective "Ravi 1980"    ADL   Eating Assistance 5  Supervision/Setup   Grooming Assistance 4  Minimal Assistance   UB Bathing Assistance 4  Minimal Assistance   LB Bathing Assistance 3  Moderate Assistance   [address] 3  Moderate Assistance   LB Dressing Assistance 3  Moderate Assistance   Toileting Assistance  3  Moderate Assistance   Functional Assistance 3  Moderate Assistance   Bed Mobility   Supine to Sit 3  Moderate assistance   Additional items Assist x 1; Increased time required;Verbal cues;LE management   Sit to Supine Unable to assess  (PT LEFT OOB WITH ALARM ON AND ALL NEEDS IN REACH )   Transfers   Sit to Stand 4  Minimal assistance   Additional items Assist x 1; Increased time required;Verbal cues   Stand to Sit 4  Minimal assistance   Additional items Assist x 1; Increased time required;Verbal cues   Functional Mobility   Functional Mobility 3  Moderate assistance   Additional Comments + MOD VERBAL/VISUAL CUES FOR SAFETY AND RW MANAGEMENT   Additional items Rolling walker   Balance   Static Sitting Fair -   Static Standing Poor +   Ambulatory Poor   Activity Tolerance   Activity Tolerance Other (Comment)  (COG)   Medical Staff Made Aware Cora Sandifer, PT    Nurse Made Aware APPROPRIATE TO SEE    RUE Assessment   RUE Assessment WFL   LUE Assessment   LUE Assessment WFL   Hand Function   Gross Motor Coordination Functional   Fine Motor Coordination Functional   Cognition   Overall Cognitive Status Impaired   Arousal/Participation Alert; Cooperative   Attention Attends with cues to redirect   Orientation Level Oriented to person;Disoriented to place; Disoriented to time;Disoriented to situation   Memory Decreased recall of precautions;Decreased recall of recent events;Decreased short term memory;Decreased recall of biographical information   Following Commands Follows one step commands inconsistently   Comments PT WITH MENINGIOMAS- UNKNOWN BASELINE COG  CURRENTLY ORIENTED TO PERSON ONLY, EXCLUDING AGE  EASLY DISTRACTED, DISORGANIZED SPEECH AT TIMES  MILDLY IMPUSLIVE WITH LIMITED INSIGHT/JUDGEMENT/SAFETY AWARENESS  RECOMMEND ALARM ON AT ALL TIMES  RECOMMEND LIGHTS ON/BLINDS OPEN DURING DAY TIME HOURS TO PROMOTE SLEEP/WAKE CYCLE   Assessment   Limitation Decreased ADL status; Decreased Safe judgement during ADL;Decreased cognition;Decreased endurance;Decreased self-care trans;Decreased high-level ADLs   Prognosis Fair;Guarded   Assessment 81 YO Female SEEN FOR INITIAL OCCUPATIONAL THERAPY EVALUATION FOLLOWING ADMISSION TO [facility] S/P FALL WITH +HEADSTRIKE  DX INCLUDE SCALP LAC WITH [facility]  PROBLEMS LIST INCLUDES MENINGIOMA, HTN, DM, HYPOKALEMIA, AND H/O CVA  PT IS POOR HISTORIAN- UNKNOWN HOME SET-UP/PLOF HOWEVER PT BELIEVED TO BE FROM group home/SNF 2' PT REPORTING "ITS A HOME BUT NOT MY HOME" AND "I LIVE WITH MANY PEOPLE"  PT REPORTS BEING OVERALL INDEPENDENT WITH ADLS HOWEVER STATES "THEY HELP ME WITH BATHING"  IT IS BELIEVED PT HAS ASSIST WITH IADLS  PT REPORTS USE OF ROLLATOR WITHOUT ASSISTANCE  NEED TO CONFIRM  PT CURRENTLY REQUIRES OVERALL MOD A WITH ADLS, MIN A WITH TRANSFERS AND MOD A WITH FUNCTIONAL MOBILITY WITH USE OF RW  PT IS LIMITED 2' PAIN, FATIGUE, IMPAIRED BALANCE, FALL RISK , LIMITED SAFETY AWARENESS/INSIGHT/JUDGEMENT, IMPULSIVE, DISORIENTATION, EASILY DISTRACTED, DISORGANIZED SPEECH, OVERALL WEAKNESS/DECONDITIONING  and OVERALL LIMITED ACTIVITY TOLERANCE  PT EDUCATED ON SLOWING OF PACE and CONTINUE PARTICIPATION IN SELF-CARE/MOBILITY WITH STAFF WH[facility] [address]   The patient's raw score on the AM-PAC Daily Activity inpatient short form is 14, standardized score is 33 39, less than 39 4  Patients at this level are likely to benefit from discharge to post-acute rehabilitation services  Please refer to the recommendation of the Occupational Therapist for safe discharge planning  FROM AN OCCUPATIONAL THERAPY PERSPECTIVE, PT WOULD BENEFIT FROM ADDITIONAL OT SERVICES IN AN INPT REHAB SETTING UPON D/C  WILL CONT TO FOLLOW TO ADDRESS THE BELOW DESCRIBED GOALS  Goals   Patient Goals TO GO FOR A WALK    LTG Time Frame 10-14   Long Term Goal #1 SEE BELOW    Plan   Treatment Interventions ADL retraining;Functional transfer training; Endurance training;Cognitive reorientation;Patient/family training;Equipment evaluation/education; Energy conservation; Activityengagement   Goal Expiration Date 05/01/21   OT Frequency 3-5x/wk   Recommendation   Recommendation Geriatric Consult   OT Discharge Recommendation Post acute rehabilitation services   OT - OK to Discharge Yes   AM-PAC Daily Activity Inpatient   Lower Body Dressing 2   Bathing 2   Toileting 2   Upper Body Dressing 2   Grooming 3   Eating 3   Daily Activity Raw Score 14   Daily Activity Standardized Score (Calc for Raw Score >=11) 33 39   AM-PAC Applied Cognition Inpatient   Following a Speech/Presentation 2   Understanding Ordinary Conversation 3   Taking Medications 2   Remembering Where Things Are Placed or Put Away 2   Remembering List of 4-5 Errands 2   Taking Care of Complicated Tasks 2   Applied Cognition Raw Score 13   Applied Cognition Standardized Score 30 46   Modified Bhavani Scale   Modified Crystal Scale 4       OCCUPATIONAL THERAPY GOALS TO BE MET WITHIN 14 DAYS:    -Pt will increase bed mobility to S LEVEL to participate in functional activities  -Pt will improve functional mobility and transfers to S LEVEL on/off all surfaces w/ F+ balanceincluding toileting   -Pt will increase independence in all ADLS to S LEVEL with G balance sitting upright in chair   -Pt will improve activity tolerance to G for 20 min txment sessions w/ G carry over of learned energy conservation techniques   -Pt will complete additional cognitive assessment with G attention to task in order to assist with safe d/c plan         Documentation completed by Marisue Severs, CLAU, OTR/L

## 2021-04-17 NOTE — ASSESSMENT & PLAN NOTE
Pleasant 80year old female that presents status post fall  Has known PMHx of meningioma under the care of [facility]  · Continue frequent neurological checks  · Imaging reviewed personally and by attending  · CT head wo 4/16/21:Tiny, 4 mm, mixed density extra-axial hemorrhage in the left temporal lobe best seen on series 2 image 12 is new from same day earlier head CT   · STAT CT head with any neurological decline including drop GCS of 2pts within 1 hr   · Can consider anti-seizure medication unless risk outweighs benefit secondary to patient's history of falls  · Pain control per primary team  · Mobilize with PT/OT  · DVT PPX: SCDs only at this time  Would recommend additional CT head for stability prior to initiation of pharmacological DVT PPX  · Ongoing medical management per primary team/trauma  · May consider cognative OT for concussion  · No acute neurosurgical intervention indicated at this juncture  · Patient may resume her home medication of Plavix in 1-2 weeks after her head imaging remains stable  Neursosurgey will sign off without outpatient follow up needed  Call with questions/concerns

## 2021-04-17 NOTE — ASSESSMENT & PLAN NOTE
Pleasant 80year old female that presents status post fall  Has known PMHx of meningioma under the care of [facility]  · Continue frequent neurological checks  · Imaging reviewed personally and by attending  · CT head wo 4/16/21:Tiny, 4 mm, mixed density extra-axial hemorrhage in the left temporal lobe best seen on series 2 image 12 is new from same day earlier head CT   · STAT CT head with any neurological decline including drop GCS of 2pts within 1 hr   · Can consider anti-seizure medication unless risk outweighs benefit secondary to patient's history of falls  · Pain control per primary team  · Mobilize with PT/OT  · DVT PPX: SCDs only at this time  Would recommend additional CT head for stability prior to initiation of pharmacological DVT PPX  · Ongoing medical management per primary team/trauma  · May consider cognative OT for concussion  · No acute neurosurgical intervention indicated at this juncture  Neursosurgey will sign off without outpatient follow up needed  Call with questions/concerns

## 2021-04-17 NOTE — PLAN OF CARE
Problem: OCCUPATIONAL THERAPY ADULT  Goal: Performs self-care activities at highest level of function for planned discharge setting  See evaluation for individualized goals  Description: Treatment Interventions: ADL retraining, Functional transfer training, Endurance training, Cognitive reorientation, Patient/family training, Equipment evaluation/education, Energy conservation, Activityengagement          See flowsheet documentation for full assessment, interventions and recommendations  Note: Limitation: Decreased ADL status, Decreased Safe judgement during ADL, Decreased cognition, Decreased endurance, Decreased self-care trans, Decreased high-level ADLs  Prognosis: Fair, Guarded  Assessment: 81 YO Female SEEN FOR INITIAL OCCUPATIONAL THERAPY EVALUATION FOLLOWING AD[facility]ION TO [facility] S/P FALL WITH +HEADSTRIKE  DX INCLUDE SCALP LAC WITH [facility]  PROBLEMS LIST INCLUDES MENINGIOMA, HTN, DM, HYPOKALEMIA, AND H/O CVA  PT IS POOR HISTORIAN- UNKNOWN HOME SET-UP/PLOF HOWEVER PT BELIEVED TO BE FROM prison/SNF 2' PT REPORTING "ITS A HOME BUT NOT MY HOME" AND "I LIVE WITH MANY PEOPLE"  PT REPORTS BEING OVERALL INDEPENDENT WITH ADLS HOWEVER STATES "THEY HELP ME WITH BATHING"  IT IS BELIEVED PT HAS ASSIST WITH IADLS  PT REPORTS USE OF ROLLATOR WITHOUT ASSISTANCE  NEED TO CONFIRM  PT CURRENTLY REQUIRES OVERALL MOD A WITH ADLS, MIN A WITH TRANSFERS AND MOD A WITH FUNCTIONAL MOBILITY WITH USE OF RW  PT IS LIMITED 2' PAIN, FATIGUE, IMPAIRED BALANCE, FALL RISK , LIMITED SAFETY AWARENESS/INSIGHT/JUDGEMENT, IMPULSIVE, DISORIENTATION, EASILY DISTRACTED, DISORGANIZED SPEECH, OVERALL WEAKNESS/DECONDITIONING  and OVERALL LIMITED ACTIVITY TOLERANCE  PT EDUCATED ON SLOWING OF PACE and CONTINUE PARTICIPATION IN SELF-CARE/MOBILITY WITH STAFF WHILE IN [facility] [address]   The patient's raw score on the AM-PAC Daily Activity inpatient short form is 14, standardized score is 33 39, less than 39 4   Patients at this level are likely to benefit from discharge to post-acute rehabilitation services  Please refer to the recommendation of the Occupational Therapist for safe discharge planning  FROM AN OCCUPATIONAL THERAPY PERSPECTIVE, PT WOULD BENEFIT FROM ADDITIONAL OT SERVICES IN AN INPT REHAB SETTING UPON D/C  WILL CONT TO FOLLOW TO ADDRESS THE BELOW DESCRIBED GOALS  Recommendation: Geriatric Consult  OT Discharge Recommendation: Post acute rehabilitation services  OT - OK to Discharge:  Yes

## 2021-04-17 NOTE — ASSESSMENT & PLAN NOTE
· Continue outpatient follow up with primary team at [address]    · No acute neurosurgical intervention is recommened

## 2021-04-17 NOTE — CASE MANAGEMENT
Patient reviewed during care coordination rounds with ADAM Pinto who informed that pt has a small brain bleed, neurosurgery to see  Pt's cognition is impaired  PT/OT to see, follow for recommendations

## 2021-04-17 NOTE — PHYSICAL THERAPY NOTE
Physical Therapy Evaluation    Patient's Name: Casa Angulo    Admitting Diagnosis  Intracranial hemorrhage ([facility] ) [I62 9]  Closed head injury, initial encounter [S09 90XA]  Scalp laceration, initial encounter [S01 01XA]    Problem List  Patient Active Problem List   Diagnosis    Benign essential hypertension    Hyperlipidemia    [facility]    Osteoporosis    Vitamin D deficiency    DMII (diabetes mellitus, type 2) ([facility] )    Medicare annual wellness visit, subsequent    Fall    Closed head injury    Scalp laceration, initial encounter    Hypokalemia       Past Medical History  Past Medical History:   Diagnosis Date    Cognitive decline     [facility]     Dyslipidemia     History of CVA (cerebrovascular accident)     Hypertension     Meningioma ([facility] )        Past Surgical History  Past Surgical History:   Procedure Laterality Date    APPENDECTOMY      HYSTERECTOMY      HYSTERECTOMY      THYROIDECTOMY      NEAR-TOTAL    TONSILLECTOMY AND ADENOIDECTOMY      TONSILLECTOMY AND ADENOIDECTOMY          04/17/21 1007   PT Last Visit   PT Visit Date 04/17/21   Note Type   Note type Evaluation   Pain Assessment   Pain Assessment Tool [facility] Pain Intervention(s) Repositioned; Ambulation/increased activity   Pain Rating: FLACC (Rest) - Face 0   Pain Rating: FLACC (Rest) - Legs 0   Pain Rating: FLACC (Rest) - Activity 0   Pain Rating: FLACC (Rest) - Cry 0   Pain Rating: FLACC (Rest) - Consolability 0   Score: FLACC (Rest) 0   Pain Rating: FLACC (Activity) - Face 1   Pain Rating: FLACC (Activity) - Legs 0   Pain Rating: FLACC (Activity) - Activity 0   Pain Rating: FLACC (Activity) - Cry 1   Pain Rating: FLACC (Activity) - Consolability 0   Score: FLACC (Activity) 2   Home Living   Home Equipment Walker  (rollator)   Additional Comments Pt poor historian, will need to clarify PLOF and home setup with CM   Per pt "I live alone, but I live with many people" and "you should see how I move with my walker, I would walk circles around your head "   Prior Function   Level of [place] Needs assistance with ADLs and functional mobility; Needs assistance with IADLs   ADL Assistance Needs assistance   IADLs Needs assistance   Falls in the last 6 months   ("many" per pt)   Vocational Retired   Restrictions/Precautions   Wells Yahaira Bearing Precautions Per Order No   Other Precautions Cognitive; Chair Alarm; Bed Alarm;Multiple lines; Fall Risk;Pain   Cognition   Overall Cognitive Status Impaired   Attention Attends with cues to redirect   Orientation Level Oriented to person;Disoriented to place; Disoriented to time;Disoriented to situation   Memory Decreased recall of precautions;Decreased recall of recent events;Decreased short term memory;Decreased recall of biographical information   Following Commands Follows one step commands with increased time or repetition   Comments Unclear baseline cog status  Pt currently confused, easily distracted, and tangential/inappropriate speech at times  RLE Assessment   RLE Assessment WFL  (grossly >3+/5)   LLE Assessment   LLE Assessment WFL  (grossly >3+/5)   Bed Mobility   Supine to Sit 3  Moderate assistance   Additional items Assist x 1;HOB elevated; Increased time required;Verbal cues;LE management   Additional Comments Pt ended session seated in recliner with chair alarm on and all needs in reach  Transfers   Sit to Stand 4  Minimal assistance   Additional items Assist x 1; Increased time required;Verbal cues   Stand to Sit 4  Minimal assistance   Additional items Assist x 1; Increased time required;Verbal cues   Additional Comments Transfers with RW  VCs for hand placement, with limited carryover   Ambulation/Elevation   Gait pattern Antalgic;Decreased foot clearance; Inconsistent nikole; Short stride; Excessively slow   Gait Assistance 3  Moderate assist   Additional items Assist x 1;Verbal cues   Assistive Device Rolling walker   Distance 30ft with pt demonstrating poor safety with RW   Balance   Static Sitting Fair   Dynamic Sitting Fair -   Static Standing Poor +   Dynamic Standing Poor   Ambulatory Poor   Activity Tolerance   Activity Tolerance Other (Comment)  (cog)   Medical Staff Made Aware OT Chise   Nurse Made Aware ok to see per RN   Assessment   Prognosis Good   Problem List Decreased strength;Decreased endurance; Impaired balance;Decreased mobility; Decreased cognition; Impaired judgement;Decreased safety awareness;Pain   Assessment Pt is a 80 y o  female seen for PT evaluation s/p admit to [facility] on 4/16/2021  Pt was admitted with a primary dx of: closed head injury s/p fall, + headstrike  Pt was initially admitted to [facility] where trauma workup was negative  However pt developed dizziness and vomited before d/c and was then transferred to [facility] for further workup  PT now consulted for assessment of mobility and d/c needs  Pt with Up in chair, active PT evaluation orders  Pts current comorbidities effecting treatment include: meningioma, HTN, DM, hypokalemia, and h/o CVA  Pts current clinical presentation is Unstable/ Unpredictable (high complexity) due to Ongoing medical management for primary dx, Decreased activity tolerance compared to baseline, Fall risk, Increased assistance needed from caregiver at current time, Cog status  Pt poor historian, unable to obtain PLOF or home setup, will need to clarify with CM  Per pt she "lives alone, but with many people" and ambulates with a rollator, reporting "many" falls  Upon evaluation, pt currently is requiring modA for bed mobility; Gutierrez for transfers and modA for ambulation 30 ft w/ RW   Pt presents at PT eval functioning below baseline and currently w/ overall mobility deficits 2* to: BLE weakness, impaired balance, decreased endurance, gait deviations, pain, decreased activity tolerance compared to baseline, decreased functional mobility tolerance compared to baseline, fall risk, decreased cognition  Pt currently at a fall risk 2* to impairments listed above  Pt will continue to benefit from skilled acute PT interventions to address stated impairments; to maximize functional mobility; for ongoing pt/ family training; and DME needs  At conclusion of PT session pt returned back in chair and chair alarm engaged with phone and call bell within reach  Pt denies any further questions at this time  The patient's AM-[facility] Basic Mobility Inpatient Short Form Raw Score is 14, Standardized Score is 35 55  A standardized score less than 42 9 suggests the patient may benefit from discharge to post-acute rehabilitation services  Please also refer to the recommendation of the Physical Therapist for safe discharge planning  Recommend rehab upon hospital D/C  Goals   Patient Goals to walk more   STG Expiration Date 04/27/21   Short Term Goal #1 In 10 days pt will be able to: 1  Demonstrate ability to perform all aspects of bed mobility independently to increase functional independence  2  Perform functional transfers at mod I level to facilitate safe return to previous living environment  3   Ambulate 200 ft with RW and supervision assist with stable vitals to improve safety with household distances and reduce fall risk  4  Navigate steps with  HR at supervision level (as needed- pending clarification of home setup) to simulate entrance to home  5  Improve LE strength grades by 1 to increase ease of functional mobility with transfers and gait  6  Pt will demonstrate improved balance by one grade order to decrease risk of falls  PT Treatment Day 0   Plan   Treatment/Interventions Functional transfer training;LE strengthening/ROM; Elevations; Therapeutic exercise; Endurance training;Bed mobility;Gait training;Spoke to nursing;Spoke to case management;OT   PT Frequency Other (Comment)  (3-5x/wk)   Recommendation   PT Discharge Recommendation Post acute rehabilitation services   [facility] Turning in Bed Without Bedrails 3   Lying on Back to Sitting on Edge of Flat Bed 2   Moving Bed to Chair 2   Standing Up From Chair 3   Walk in Room 2   Climb 3-5 Stairs 2   Basic Mobility Inpatient Raw Score 14   Basic Mobility Standardized Score 35 55   Modified [place] Scale   Modified [place] Scale 4       Danitza Bowers, PT, DPT

## 2021-04-17 NOTE — ASSESSMENT & PLAN NOTE
· Continue outpatient follow up with primary team at [address] [place] [address] and for evaluation for consideration of MRI brain    · No acute neurosurgical intervention is recommened

## 2021-04-17 NOTE — PLAN OF CARE
Problem: Potential for Falls  Goal: Patient will remain free of falls  Description: INTERVENTIONS:  - Assess patient frequently for physical needs  -  Identify cognitive and physical deficits and behaviors that affect risk of falls    -  [place] fall precautions as indicated by assessment   - Educate patient/family on patient safety including physical limitations  - Instruct patient to call for assistance with activity based on assessment  - Modify environment to reduce risk of injury  - Consider OT/PT consult to assist with strengthening/mobility  Outcome: Progressing     Problem: Prexisting or High Potential for Compromised Skin Integrity  Goal: Skin integrity is maintained or improved  Description: INTERVENTIONS:  - Identify patients at risk for skin breakdown  - Assess and monitor skin integrity  - Assess and monitor nutrition and hydration status  - Monitor labs   - Assess for incontinence   - Turn and reposition patient  - Assist with mobility/ambulation  - Relieve pressure over bony prominences  - Avoid friction and shearing  - Provide appropriate hygiene as needed including keeping skin clean and dry  - Evaluate need for skin moisturizer/barrier cream  - Collaborate with interdisciplinary team   - Patient/family teaching  - Consider wound care consult   Outcome: Progressing     Problem: PAIN - ADULT  Goal: Verbalizes/displays adequate comfort level or baseline comfort level  Description: Interventions:  - Encourage patient to monitor pain and request assistance  - Assess pain using appropriate pain scale  - Administer analgesics based on type and severity of pain and evaluate response  - Implement non-pharmacological measures as appropriate and evaluate response  - Consider cultural and social influences on pain and pain management  - Notify physician/advanced practitioner if interventions unsuccessful or patient reports new pain  Outcome: Progressing     Problem: INFECTION - ADULT  Goal: Absence or prevention of progression during hospitalization  Description: INTERVENTIONS:  - Assess and monitor for signs and symptoms of infection  - Monitor lab/diagnostic results  - Monitor all insertion sites, i e  indwelling lines, tubes, and drains  - Monitor endotracheal if appropriate and nasal secretions for changes in amount and color  - [place] appropriate cooling/warming therapies per order  - Administer medications as ordered  - Instruct and encourage patient and family to use good hand hygiene technique  - Identify and instruct in appropriate isolation precautions for identified infection/condition  Outcome: Progressing  Goal: Absence of fever/infection during neutropenic period  Description: INTERVENTIONS:  - Monitor WBC    Outcome: Progressing     Problem: SAFETY ADULT  Goal: Patient will remain free of falls  Description: INTERVENTIONS:  - Assess patient frequently for physical needs  -  Identify cognitive and physical deficits and behaviors that affect risk of falls    -  [place] fall precautions as indicated by assessment   - Educate patient/family on patient safety including physical limitations  - Instruct patient to call for assistance with activity based on assessment  - Modify environment to reduce risk of injury  - Consider OT/PT consult to assist with strengthening/mobility  Outcome: Progressing  Goal: Maintain or return to baseline ADL function  Description: INTERVENTIONS:  -  Assess patient's ability to carry out ADLs; assess patient's baseline for ADL function and identify physical deficits which impact ability to perform ADLs (bathing, care of mouth/teeth, toileting, grooming, dressing, etc )  - Assess/evaluate cause of self-care deficits   - Assess range of motion  - Assess patient's mobility; develop plan if impaired  - Assess patient's need for assistive devices and provide as appropriate  - Encourage maximum independence but intervene and supervise when necessary  - Involve family in performance of ADLs  - Assess for home care needs following discharge   - Consider OT consult to assist with ADL evaluation and planning for discharge  - Provide patient education as appropriate  Outcome: Progressing  Goal: Maintain or return mobility status to optimal level  Description: INTERVENTIONS:  - Assess patient's baseline mobility status (ambulation, transfers, stairs, etc )    - Identify cognitive and physical deficits and behaviors that affect mobility  - Identify mobility aids required to assist with transfers and/or ambulation (gait belt, sit-to-stand, lift, walker, cane, etc )  - [place] fall precautions as indicated by assessment  - Record patient progress and toleration of activity level on Mobility SBAR; progress patient to next Phase/Stage  - Instruct patient to call for assistance with activity based on assessment  - Consider rehabilitation consult to assist with strengthening/weightbearing, etc   Outcome: Progressing     Problem: DISCHARGE PLANNING  Goal: Discharge to home or other facility with appropriate resources  Description: INTERVENTIONS:  - Identify barriers to discharge w/patient and caregiver  - Arrange for needed discharge resources and transportation as appropriate  - Identify discharge learning needs (meds, wound care, etc )  - Arrange for interpretive services to assist at discharge as needed  - Refer to Case Management Department for coordinating discharge planning if the patient needs post-hospital services based on physician/advanced practitioner order or complex needs related to functional status, cognitive ability, or social support system  Outcome: Progressing     Problem: Knowledge Deficit  Goal: Patient/family/caregiver demonstrates understanding of disease process, treatment plan, medications, and discharge instructions  Description: Complete learning assessment and assess knowledge base    Interventions:  - Provide teaching at level of understanding  - Provide teaching via preferred learning methods  Outcome: Progressing     Problem: Nutrition/Hydration-ADULT  Goal: Nutrient/Hydration intake appropriate for improving, restoring or maintaining nutritional needs  Description: Monitor and assess patient's nutrition/hydration status for malnutrition  Collaborate with interdisciplinary team and initiate plan and interventions as ordered  Monitor patient's weight and dietary intake as ordered or per policy  Utilize nutrition screening tool and intervene as necessary  Determine patient's food preferences and provide high-protein, high-caloric foods as appropriate       INTERVENTIONS:  - Monitor oral intake, urinary output, labs, and treatment plans  - Assess nutrition and hydration status and recommend course of action  - Evaluate amount of meals eaten  - Assist patient with eating if necessary   - Allow adequate time for meals  - Recommend/ encourage appropriate diets, oral nutritional supplements, and vitamin/mineral supplements  - Order, calculate, and assess calorie counts as needed  - Recommend, monitor, and adjust tube feedings and TPN/PPN based on assessed needs  - Assess need for intravenous fluids  - Provide specific nutrition/hydration education as appropriate  - Include patient/family/caregiver in decisions related to nutrition  Outcome: Progressing

## 2021-04-17 NOTE — ASSESSMENT & PLAN NOTE
- Hypokalemia, present on admission     - Replete as indicated  - Repeat BMP on 04/17/2021 K+ of 3 7  - Continue to monitor while inpatient  - Outpatient follow-up with PCP

## 2021-04-17 NOTE — PROGRESS NOTES
[address] [facility]  Progress Note - Paul Tirado 10/27/1928, 80 y o  female MRN: [id number]  Unit/Bed#: [facility] 601-01 Encounter: [id number]  Primary Care Provider: No primary care provider on file  Date and time admitted to hospital: 4/16/2021  3:43 PM    Hypokalemia  Assessment & Plan  - Hypokalemia, present on admission     - Replete as indicated  - Repeat BMP on 04/17/2021 K+ of 3 7  - Continue to monitor while inpatient  - Outpatient follow-up with PCP  Scalp laceration, initial encounter  Assessment & Plan  - Left posterior scalp laceration not requiring intervention     - Local wound care as indicated  - Analgesia as needed  - Update tetanus vaccine  Fall  Assessment & Plan  - Status post fall with the below noted injuries  - Fall precautions  - Geriatric Medicine consultation for evaluation, medication review and recommendations   - PT and OT evaluation and treatment as indicated  - Case Management consultation for disposition planning  DMII (diabetes mellitus, type 2) ([facility] [address] )  Assessment & Plan    Lab Results   Component Value Date    HGBA1C 7 6 (H) 11/27/2019     - Initiate subcutaneous insulin regimen     - Resume home medication therapy on discharge  - Outpatient follow-up with PCP  Meningioma [facility])  Assessment & Plan  - Chronic meningiomas again identified on CT scans prior to transfer today  - Patient follows with [facility] Neurosurgery as an outpatient     - Neurosurgery recommends follow up outpatient with designated provider      Benign essential hypertension  Assessment & Plan  - Continue home medication regimen     - Outpatient follow-up with PCP  * Closed head injury  Assessment & Plan  - Closed head injury with small extra-axial injury concerning for and traumatic brain injury/bleed following fall  - Admit to trauma service with HOT protocol   - Neurosurgery evaluation and recommendations appreciated    - Hold anti-platelet and anticoagulant medications for least 2 weeks and/or until cleared by Neurosurgery  Patient was given DDAVP prior to transfer  - Continue Keppra for 7 days for seizure prophylaxis  - May continue Meclizine for dizziness  - Monitor neurologic exam   - Continue symptomatic management with analgesia as needed  - PT and OT evaluation and treatment as indicated  - Outpatient Neurosurgery follow-up in 2 weeks with repeat CT scan of the head prior to follow-up appointment  TERTIARY TRAUMA SURVEY NOTE    Prophylaxis: Sequential compression device (Venodyne)     Disposition: Continue step down 2 level of care  Code status:  Level 3 - DNAR and DNI    Consultants: Neurosurgery, Gerontology    Is the patient 72 years or older?: YES:    1  Before the illness or injury that brought you to the Emergency, did you need someone to help you on a regular basis? 1=Yes   2  Since the illness or injury that brought you to the Emergency, have you needed more help than usual to take care of yourself? 0=No   3  Have you been hospitalized for one or more nights during the past 6 months (excluding a stay in the Emergency Department)? 0 = No   4  In general, do you see well? 1=No   5  In general, do you have serious problems with your memory? 1=Yes   6  Do you take more than three different medications everyday? 1=Yes   TOTAL   4     Did you order a geriatric consult if the score was 2 or greater?: yes          SUBJECTIVE:     Transfer from: [facility]  Outside Films Received: yes  Tertiary Exam Due on: 4/17/2021    Mechanism of Injury:Fall    Details related to Injury: +LOC:  no    Chief Complaint: "I don't know"    HPI/Last 24 hour events:  31-year-old female has been experiencing delirium during her hospitalization and replies I do not know when asked how she feels  She is writhing around in the bed, seemingly restless  She has some confusion at baseline, but is very confused and disoriented today   I tried to call her family multiple times today and no answer  Active medications:           Current Facility-Administered Medications:     acetaminophen (TYLENOL) tablet 975 mg, 975 mg, Oral, Q8H VIRY    amLODIPine (NORVASC) tablet 5 mg, 5 mg, Oral, Daily, 5 mg at 04/17/21 0909    insulin glargine (LANTUS) subcutaneous injection 9 Units 0 09 mL, 9 Units, Subcutaneous, HS, 9 Units at 04/16/21 2300    insulin lispro (HumaLOG) 100 units/mL subcutaneous injection 1-5 Units, 1-5 Units, Subcutaneous, HS    insulin lispro (HumaLOG) 100 units/mL subcutaneous injection 1-5 Units, 1-5 Units, Subcutaneous, TID AC **AND** Fingerstick Glucose (POCT), , , TID AC    insulin lispro (HumaLOG) 100 units/mL subcutaneous injection 3 Units, 3 Units, Subcutaneous, Daily With Breakfast, 3 Units at 04/17/21 0912    insulin lispro (HumaLOG) 100 units/mL subcutaneous injection 3 Units, 3 Units, Subcutaneous, Daily With Lunch    insulin lispro (HumaLOG) 100 units/mL subcutaneous injection 3 Units, 3 Units, Subcutaneous, Daily With Dinner    levETIRAcetam (KEPPRA) tablet 500 mg, 500 mg, Oral, Q12H VIRY, 500 mg at 04/17/21 0909    losartan potassium-hydrochlorothiazide (HYZAAR 50/12  5) combination, , Oral, Daily, Given at 04/17/21 1151    naloxone (NARCAN) 0 04 mg/mL syringe 0 04 mg, 0 04 mg, Intravenous, Q1MIN PRN    ondansetron (ZOFRAN) injection 4 mg, 4 mg, Intravenous, Q6H PRN    oxyCODONE (ROXICODONE) IR tablet 2 5 mg, 2 5 mg, Oral, Q4H PRN    oxyCODONE (ROXICODONE) IR tablet 5 mg, 5 mg, Oral, Q4H PRN    pravastatin (PRAVACHOL) tablet 40 mg, 40 mg, Oral, Daily With Dinner, 40 mg at 04/16/21 2100    senna (SENOKOT) tablet 17 2 mg, 2 tablet, Oral, HS PRN      OBJECTIVE:     Vitals:   Vitals:    04/17/21 1124   BP: 139/71   Pulse: 56   Resp: 18   Temp: 98 1 °F (36 7 °C)   SpO2: 100%       Physical Exam:   GENERAL APPEARANCE: Patient restless  HEENT: Left posterior scalp abrasion; PERRL, EOMs intact; Mucous membranes moist  NECK / BACK: ROM intact, nontender  CV: Regular rate and rhythm; no murmur/gallops/rubs appreciated  CHEST / LUNGS: Clear to auscultation; no wheezes/rales/rhonci  ABD: NABS; soft; non-distended; non-tender  : Voiding  EXT: +2 pulses bilaterally upper & lower extremities; no edema  NEURO: GCS 14 due to confusion; no focal neurologic deficits; neurovascularly intact  SKIN: Warm, dry and well perfused; no rash; no jaundice  I/O:   I/O       04/15 0701 - 04/16 0700 04/16 0701 - 04/17 0700    Urine  78    Stool  2    Total Output  80    Net  -80          Unmeasured Urine Occurrence  3 x          Invasive Devices: Invasive Devices     Peripheral Intravenous Line            Peripheral IV 04/16/21 Right Antecubital 1 day                  Imaging:   Ct Head Without Contrast    Result Date: 4/16/2021  Impression: 1  Tiny, 4 mm, mixed density extra-axial hemorrhage in the left temporal lobe best seen on series 2 image 12 is new from same day earlier head CT  2   Large right posterior fossa lesion along the right transverse sinus and tentorium with subjacent right cerebellar vasogenic edema and mass effect upon brainstem  There is mild right tonsillar herniation  Effacement of right cerebellar and ambient cisterns as well as 4th ventricle with slightly enlarged ventricular system  Findings are similar to same day earlier head CT  This is characterized as meningioma in prior outside MRI  No prior outside imaging is available in PACS for comparison  3   Additional smaller calcified extra-axial lesion most consistent with meningioma  I personally discussed this study with Maria Elena Tracy on 4/16/2021 at 1:43 PM  Workstation performed: [id number]     Ct Head Without Contrast    Result Date: 4/16/2021  Impression: 1  Cerebral atrophy with chronic small vessel ischemic white matter disease  No acute intracranial abnormality   2   Multiple extra-axial soft tissue masses, several of which are calcified, consistent with multiple meningioma  Largest lesion is in the posterior fossa on the right  The lesion has increased in size from the prior outside reports  There is mass effect on the 4th ventricle with a mild amount of surrounding vasogenic edema  3   Soft tissue swelling overlying the left parietal convexity  Underlying bony calvarium intact  Recommend follow-up neurosurgical evaluation and/or MRI of the brain with intravenous contrast material   I personally discussed this study with Loli Soto on 4/16/2021 at 7:39 AM  Workstation performed: [id number]     Ct Cervical Spine Without Contrast    Result Date: 4/16/2021  Impression: Degenerative changes of the cervical spine  No acute cervical spine fracture or traumatic malalignment   I personally discussed this study with Loli Soto on 4/16/2021 at 7:39 AM   Workstation performed: [id number]       Labs:   CBC: No results found for: WBC, HGB, HCT, MCV, PLT, ADJUSTEDWBC, MCH, MCHC, RDW, MPV, NRBC  CMP:   Lab Results   Component Value Date     04/17/2021    CO2 28 04/17/2021    BUN 11 04/17/2021    CREATININE 0 52 (L) 04/17/2021    CALCIUM 9 2 04/17/2021    EGFR 83 04/17/2021

## 2021-04-17 NOTE — CONSULTS
[address] E Ayaz 10/27/1928, 80 y o  female MRN: [id number]  Unit/Bed#: [facility] 601-01 Encounter: [id number]  Primary Care Provider: No primary care provider on file  Date and time admitted to hospital: 4/16/2021  3:43 PM    Inpatient consult to Neurosurgery  Consult performed by: Prince Barrow PA-C  Consult ordered by: Gavin Fraser PA-C          * Closed head injury  Assessment & Plan  Pleasant 80year old female that presents status post fall  Has known PMHx of meningioma under the care of [facility]  · Continue frequent neurological checks  · Imaging reviewed personally and by attending  · CT head wo 4/16/21:Tiny, 4 mm, mixed density extra-axial hemorrhage in the left temporal lobe best seen on series 2 image 12 is new from same day earlier head CT   · STAT CT head with any neurological decline including drop GCS of 2pts within 1 hr   · Can consider anti-seizure medication unless risk outweighs benefit secondary to patient's history of falls  · Pain control per primary team  · Mobilize with PT/OT  · DVT PPX: SCDs only at this time  Would recommend additional CT head for stability prior to initiation of pharmacological DVT PPX  · Ongoing medical management per primary team/trauma  · May consider cognative OT for concussion  · No acute neurosurgical intervention indicated at this juncture  · Patient may resume her home medication of Plavix in 1-2 weeks after her head imaging remains stable  Neursosurgey will sign off without outpatient follow up needed  Call with questions/concerns  Meningioma [facility])  Assessment & Plan  · Continue outpatient follow up with primary team at [address] [place] [address] and for evaluation for consideration of MRI brain  · No acute neurosurgical intervention is recommened    History of Present Illness     This is a pleasant 80year old female that presents status post fall 1 day ago    The patient presented as a transfer from [facility]  The patient confirmed that she struck her head but she did not lose consciousness  At this time, the patient does not endorse any headaches, nausea, vomiting, dizziness, numbness, tingling, bowel bladder incontinence, or saddle anesthesia  The patient does endorse a chronic history of a left foot drop  Of note, the patient does presents with some confusion  She stated that she is [place] and is unaware what year it is  The patient also has a PMHx of meningiomas in which follows up with [facility]  Review of Systems   Constitutional: Negative  HENT: Negative  Eyes: Negative  Respiratory: Negative  Cardiovascular: Negative  Gastrointestinal: Negative  Negative for nausea and vomiting  Endocrine: Negative  Genitourinary: Negative  Musculoskeletal: Negative for arthralgias, back pain, neck pain and neck stiffness  Skin: Negative  Negative for color change  Neurological: Negative for dizziness, seizures, syncope, weakness, light-headedness and headaches  Psychiatric/Behavioral: Negative          Historical Information   Past Medical History:   Diagnosis Date    Cognitive decline     Diabetes mellitus ([facility] )     Dyslipidemia     History of CVA (cerebrovascular accident)     Hypertension     Meningioma ([facility] )      Past Surgical History:   Procedure Laterality Date    APPENDECTOMY      HYSTERECTOMY      HYSTERECTOMY      THYROIDECTOMY      NEAR-TOTAL    TONSILLECTOMY AND ADENOIDECTOMY      TONSILLECTOMY AND ADENOIDECTOMY       Social History     Substance and Sexual Activity   Alcohol Use No    Comment: (HISTORY)     Social History     Substance and Sexual Activity   Drug Use No     Social History     Tobacco Use   Smoking Status Never Smoker   Smokeless Tobacco Never Used     Family History   Problem Relation Age of Onset    Diabetes Mother     Other Mother         LIVER TUMOR    Cancer Family     Diabetes Family     Hypertension Family Meds/Allergies   all current active meds have been reviewed  Allergies   Allergen Reactions    Aspartame - Food Allergy Other (See Comments)     diarrhea       Objective   I/O       04/15 0701 - 04/16 0700 04/16 0701 - 04/17 0700 04/17 0701 - 04/18 0700    P  O    60    Total Intake   60    Urine  78     Stool  2     Total Output  80     Net  -80 +60           Unmeasured Urine Occurrence  3 x 1 x    Unmeasured Stool Occurrence   1 x          Physical Exam  Vitals signs reviewed  Constitutional:       General: She is awake  She is not in acute distress  Appearance: Normal appearance  She is normal weight  She is not ill-appearing  HENT:      Head: Normocephalic  Nose: Nose normal       Mouth/Throat:      Mouth: Mucous membranes are dry  Eyes:      Extraocular Movements: Extraocular movements intact  Conjunctiva/sclera: Conjunctivae normal       Pupils: Pupils are equal, round, and reactive to light  Neck:      Musculoskeletal: Normal range of motion  Cardiovascular:      Pulses: Normal pulses  Pulmonary:      Effort: Pulmonary effort is normal       Breath sounds: Normal breath sounds  Abdominal:      General: Abdomen is flat  Skin:     General: Skin is warm and dry  Neurological:      General: No focal deficit present  Mental Status: She is alert  GCS: GCS verbal subscore is 4  Cranial Nerves: Dysarthria present  Sensory: Sensation is intact  Motor: Motor function is intact  Coordination: Finger-Nose-Finger Test abnormal       Deep Tendon Reflexes:      Reflex Scores:       Brachioradialis reflexes are 1+ on the right side and 1+ on the left side  Patellar reflexes are 1+ on the right side and 1+ on the left side  Psychiatric:         Attention and Perception: Attention and perception normal          Mood and Affect: Mood is anxious  Speech: Speech is tangential          Behavior: Behavior normal  Behavior is cooperative  Thought Content: Thought content normal          Cognition and Memory: Cognition normal  Memory is impaired  Judgment: Judgment normal        Neurologic Exam     Mental Status   Disoriented to place  Disoriented to year  Oriented to month  Oriented to place (hospital)     Cranial Nerves     CN III, IV, VI   Pupils are equal, round, and reactive to light  Gait, Coordination, and Reflexes     Coordination   Finger to nose coordination: abnormal    Reflexes   Right brachioradialis: 1+  Left brachioradialis: 1+  Right patellar: 1+  Left patellar: 1+        Vitals:Blood pressure 148/64, pulse 56, temperature 98 °F (36 7 °C), resp  rate 16, SpO2 100 %  ,There is no height or weight on file to calculate BMI  Lab Results:   Results from last 7 days   Lab Units 04/16/21  0651   WBC Thousand/uL 7 15   HEMOGLOBIN g/dL 12 6   HEMATOCRIT % 38 7   PLATELETS Thousands/uL 233   NEUTROS PCT % 69   MONOS PCT % 10     Results from last 7 days   Lab Units 04/17/21  0455 04/16/21  0651   POTASSIUM mmol/L 3 7 3 2*   CHLORIDE mmol/L 106 106   CO2 mmol/L 28 30   BUN mg/dL 11 18   CREATININE mg/dL 0 52* 0 59*   CALCIUM mg/dL 9 2 9 2     Results from last 7 days   Lab Units 04/17/21  0455   MAGNESIUM mg/dL 1 8             No results found for: TROPONINT  ABG:No results found for: PHART, AFY2KGC, PO2ART, TMT8JGB, S7LHXFES, BEART, SOURCE    Imaging Studies: I have personally reviewed pertinent films in PACS with attending     Ct Head Without Contrast    Result Date: 4/16/2021  Impression: 1  Tiny, 4 mm, mixed density extra-axial hemorrhage in the left temporal lobe best seen on series 2 image 12 is new from same day earlier head CT  2   Large right posterior fossa lesion along the right transverse sinus and tentorium with subjacent right cerebellar vasogenic edema and mass effect upon brainstem  There is mild right tonsillar herniation    Effacement of right cerebellar and ambient cisterns as well as 4th ventricle with slightly enlarged ventricular system  Findings are similar to same day earlier head CT  This is characterized as meningioma in prior outside MRI  No prior outside imaging is available in PACS for comparison  3   Additional smaller calcified extra-axial lesion most consistent with meningioma  I personally discussed this study with Bekah Lee on 4/16/2021 at 1:43 PM  Workstation performed: [id number]     Ct Head Without Contrast    Result Date: 4/16/2021  Impression: 1  Cerebral atrophy with chronic small vessel ischemic white matter disease  No acute intracranial abnormality  2   Multiple extra-axial soft tissue masses, several of which are calcified, consistent with multiple meningioma  Largest lesion is in the posterior fossa on the right  The lesion has increased in size from the prior outside reports  There is mass effect on the 4th ventricle with a mild amount of surrounding vasogenic edema  3   Soft tissue swelling overlying the left parietal convexity  Underlying bony calvarium intact  Recommend follow-up neurosurgical evaluation and/or MRI of the brain with intravenous contrast material   I personally discussed this study with Bekah Lee on 4/16/2021 at 7:39 AM  Workstation performed: [id number]     Ct Cervical Spine Without Contrast    Result Date: 4/16/2021  Impression: Degenerative changes of the cervical spine  No acute cervical spine fracture or traumatic malalignment  I personally discussed this study with Bekah Lee on 4/16/2021 at 7:39 AM   Workstation performed: [id number]       Code Status: Level 3 - DNAR and DNI  Advance Directive and Living Will:      Power of :    POLST:      Counseling / Coordination of Care  I spent 20 minutes with the patient

## 2021-04-17 NOTE — PROGRESS NOTES
Called into room by PCA's at 0500 that patient is uncooperative and not allowing them to draw her lab worked  Upon walking in the patients room patient appeared agitated and is not willing to cooperative in her 0500 neuro exam  She will not answer any questions  When I ask patient to open her eyes or if I can look inside of them patient responds with "no only god can see inside my eyes " Patient is moving all extremities and has her eyes open, but when you ask her to look inside her eyes she closes them tightly shut and refuses  Patient stated, " she wants god to take her "  Attempted multiple times to get patient to follow commands with different staff members  Patient was pleasant, cooperative, and following commands prior to this  Trauma resident notified

## 2021-04-18 LAB
ANION GAP SERPL CALCULATED.3IONS-SCNC: 5 MMOL/L (ref 4–13)
BASOPHILS # BLD AUTO: 0.07 THOUSANDS/ΜL (ref 0–0.1)
BASOPHILS NFR BLD AUTO: 1 % (ref 0–1)
BUN SERPL-MCNC: 15 MG/DL (ref 5–25)
CALCIUM SERPL-MCNC: 8.9 MG/DL (ref 8.3–10.1)
CHLORIDE SERPL-SCNC: 106 MMOL/L (ref 100–108)
CO2 SERPL-SCNC: 25 MMOL/L (ref 21–32)
CREAT SERPL-MCNC: 0.66 MG/DL (ref 0.6–1.3)
EOSINOPHIL # BLD AUTO: 0.14 THOUSAND/ΜL (ref 0–0.61)
EOSINOPHIL NFR BLD AUTO: 2 % (ref 0–6)
ERYTHROCYTE [DISTWIDTH] IN BLOOD BY AUTOMATED COUNT: 12.7 % (ref 11.6–15.1)
GFR SERPL CREATININE-BSD FRML MDRD: 77 ML/MIN/1.73SQ M
GLUCOSE SERPL-MCNC: 127 MG/DL (ref 65–140)
GLUCOSE SERPL-MCNC: 132 MG/DL (ref 65–140)
GLUCOSE SERPL-MCNC: 154 MG/DL (ref 65–140)
GLUCOSE SERPL-MCNC: 157 MG/DL (ref 65–140)
GLUCOSE SERPL-MCNC: 202 MG/DL (ref 65–140)
HCT VFR BLD AUTO: 39.1 % (ref 34.8–46.1)
HGB BLD-MCNC: 12.8 G/DL (ref 11.5–15.4)
IMM GRANULOCYTES # BLD AUTO: 0.05 THOUSAND/UL (ref 0–0.2)
IMM GRANULOCYTES NFR BLD AUTO: 1 % (ref 0–2)
LYMPHOCYTES # BLD AUTO: 1.23 THOUSANDS/ΜL (ref 0.6–4.47)
LYMPHOCYTES NFR BLD AUTO: 13 % (ref 14–44)
MCH RBC QN AUTO: 30.7 PG (ref 26.8–34.3)
MCHC RBC AUTO-ENTMCNC: 32.7 G/DL (ref 31.4–37.4)
MCV RBC AUTO: 94 FL (ref 82–98)
MONOCYTES # BLD AUTO: 1.05 THOUSAND/ΜL (ref 0.17–1.22)
MONOCYTES NFR BLD AUTO: 11 % (ref 4–12)
NEUTROPHILS # BLD AUTO: 6.73 THOUSANDS/ΜL (ref 1.85–7.62)
NEUTS SEG NFR BLD AUTO: 72 % (ref 43–75)
NRBC BLD AUTO-RTO: 0 /100 WBCS
PLATELET # BLD AUTO: 256 THOUSANDS/UL (ref 149–390)
PMV BLD AUTO: 11 FL (ref 8.9–12.7)
POTASSIUM SERPL-SCNC: 3.3 MMOL/L (ref 3.5–5.3)
RBC # BLD AUTO: 4.17 MILLION/UL (ref 3.81–5.12)
SODIUM SERPL-SCNC: 136 MMOL/L (ref 136–145)
WBC # BLD AUTO: 9.27 THOUSAND/UL (ref 4.31–10.16)

## 2021-04-18 PROCEDURE — 85025 COMPLETE CBC W/AUTO DIFF WBC: CPT | Performed by: SURGERY

## 2021-04-18 PROCEDURE — 82948 REAGENT STRIP/BLOOD GLUCOSE: CPT

## 2021-04-18 PROCEDURE — 99232 SBSQ HOSP IP/OBS MODERATE 35: CPT | Performed by: SURGERY

## 2021-04-18 PROCEDURE — 80048 BASIC METABOLIC PNL TOTAL CA: CPT | Performed by: SURGERY

## 2021-04-18 RX ORDER — POTASSIUM CHLORIDE 20 MEQ/1
20 TABLET, EXTENDED RELEASE ORAL 2 TIMES DAILY
Status: DISCONTINUED | OUTPATIENT
Start: 2021-04-18 | End: 2021-04-19 | Stop reason: HOSPADM

## 2021-04-18 RX ORDER — HEPARIN SODIUM 5000 [USP'U]/ML
5000 INJECTION, SOLUTION INTRAVENOUS; SUBCUTANEOUS EVERY 8 HOURS SCHEDULED
Status: DISCONTINUED | OUTPATIENT
Start: 2021-04-18 | End: 2021-04-19 | Stop reason: HOSPADM

## 2021-04-18 RX ADMIN — POTASSIUM CHLORIDE 20 MEQ: 1500 TABLET, EXTENDED RELEASE ORAL at 12:39

## 2021-04-18 RX ADMIN — INSULIN LISPRO 1 UNITS: 100 INJECTION, SOLUTION INTRAVENOUS; SUBCUTANEOUS at 11:23

## 2021-04-18 RX ADMIN — LEVETIRACETAM 500 MG: 500 TABLET, FILM COATED ORAL at 08:28

## 2021-04-18 RX ADMIN — POTASSIUM CHLORIDE 20 MEQ: 1500 TABLET, EXTENDED RELEASE ORAL at 17:04

## 2021-04-18 RX ADMIN — QUETIAPINE FUMARATE 25 MG: 25 TABLET ORAL at 21:30

## 2021-04-18 RX ADMIN — INSULIN LISPRO 1 UNITS: 100 INJECTION, SOLUTION INTRAVENOUS; SUBCUTANEOUS at 17:04

## 2021-04-18 RX ADMIN — LOSARTAN POTASSIUM: 50 TABLET, FILM COATED ORAL at 08:28

## 2021-04-18 RX ADMIN — PRAVASTATIN SODIUM 40 MG: 40 TABLET ORAL at 17:04

## 2021-04-18 RX ADMIN — INSULIN GLARGINE 9 UNITS: 100 INJECTION, SOLUTION SUBCUTANEOUS at 21:33

## 2021-04-18 RX ADMIN — ACETAMINOPHEN 975 MG: 325 TABLET ORAL at 13:53

## 2021-04-18 RX ADMIN — LEVETIRACETAM 500 MG: 500 TABLET, FILM COATED ORAL at 21:30

## 2021-04-18 RX ADMIN — ACETAMINOPHEN 975 MG: 325 TABLET ORAL at 21:30

## 2021-04-18 RX ADMIN — AMLODIPINE BESYLATE 5 MG: 5 TABLET ORAL at 08:28

## 2021-04-18 RX ADMIN — HEPARIN SODIUM 5000 UNITS: 5000 INJECTION INTRAVENOUS; SUBCUTANEOUS at 21:29

## 2021-04-18 RX ADMIN — INSULIN LISPRO 1 UNITS: 100 INJECTION, SOLUTION INTRAVENOUS; SUBCUTANEOUS at 21:29

## 2021-04-18 RX ADMIN — ACETAMINOPHEN 975 MG: 325 TABLET ORAL at 05:19

## 2021-04-18 RX ADMIN — HEPARIN SODIUM 5000 UNITS: 5000 INJECTION INTRAVENOUS; SUBCUTANEOUS at 13:54

## 2021-04-18 NOTE — PROGRESS NOTES
[address] [facility]  Progress Note - Ferny Kohli 10/27/1928, 80 y o  female MRN: [id number]  Unit/Bed#: [facility] 601-01 Encounter: [id number]  Primary Care Provider: No primary care provider on file  Date and time admitted to hospital: 4/16/2021  3:43 PM    Hypokalemia  Assessment & Plan  - Hypokalemia, present on admission     - Replete as indicated, K-dur 20 mEq BID started 4/18, wean as necessary  - Continue to monitor while inpatient  - Outpatient follow-up with PCP  Scalp laceration, initial encounter  Assessment & Plan  - Left posterior scalp laceration not requiring intervention     - Local wound care as indicated  - Analgesia as needed  - Update tetanus vaccine  Fall  Assessment & Plan  - Status post fall with the below noted injuries  - Fall precautions  - Geriatric Medicine consultation for evaluation, medication review and recommendations   - PT and OT evaluation and treatment as indicated, Recommending disposition to post acute rehabilitation   - Case Management consultation for disposition planning  DMII (diabetes mellitus, type 2) ([facility] )  Assessment & Plan    Lab Results   Component Value Date    HGBA1C 7 6 (H) 11/27/2019     - SSI  - Resume home medication therapy on discharge  - Outpatient follow-up with PCP  Meningioma [facility])  Assessment & Plan  - Chronic meningiomas again identified on CT scans prior to transfer and on repeat imaging  - Patient follows with [facility] Neurosurgery as an outpatient     - Neurosurgery recommends follow up outpatient with designated provider      Benign essential hypertension  Assessment & Plan  - Continue home medication regimen     - Outpatient follow-up with PCP  * Closed head injury  Assessment & Plan  - Closed head injury with small extra-axial injury concerning for and traumatic brain injury/bleed following fall    - Admit to trauma service with HOT protocol   - Neurosurgery evaluation and recommendations appreciated  - Hold anti-platelet and anticoagulant medications for least 2 weeks and/or until cleared by Neurosurgery  Patient was given DDAVP prior to transfer  - Continue Keppra for 7 days for seizure prophylaxis  - Repeat CTH 4/17: No significant intracranial hemorrhage is seen  - May continue Meclizine for dizziness  - Monitor neurologic exam   - Continue symptomatic management with analgesia as needed  - PT and OT evaluation and treatment as indicated  - Outpatient Neurosurgery follow-up for closed head injury not necessary, follow up with current designated [facility] Neurosurgery office or [facility] for known meningioma diagnosis         DVT ppx: SQH, SCD  PT and OT:  Evaluate and treat, recommending rehab    Disposition:  Med surg level of care, Discharge pending rehab placement  Daughter called requesting call back , but no phone numbers listed  Will continue to try to contact family  SUBJECTIVE:  Chief Complaint: "I'm feeling better"    Subjective: Patient reports that she is feeling better and feels rested  Patient was able to sleep last night  Patient is still confused and a poor historian         OBJECTIVE:     Meds/Allergies     Current Facility-Administered Medications:     acetaminophen (TYLENOL) tablet 975 mg, 975 mg, Oral, Q8H [facility], Nathen Branham PA-C, 975 mg at 04/18/21 0519    amLODIPine (NORVASC) tablet 5 mg, 5 mg, Oral, Daily, Nathen Branham PA-C, 5 mg at 04/18/21 7755    heparin (porcine) subcutaneous injection 5,000 Units, 5,000 Units, Subcutaneous, Q8H [facility], Westley Wu PA-C    insulin glargine (LANTUS) subcutaneous injection 9 Units 0 09 mL, 9 Units, Subcutaneous, HS, Nathen Branham PA-C, 9 Units at 04/17/21 2158    insulin lispro (HumaLOG) 100 units/mL subcutaneous injection 1-5 Units, 1-5 Units, Subcutaneous, HS, Nathen Branham PA-C, 1 Units at 04/17/21 2155    insulin lispro (HumaLOG) 100 units/mL subcutaneous injection 1-5 Units, 1-5 Units, Subcutaneous, TID AC, 1 Units at 04/18/21 1123 **AND** Fingerstick Glucose (POCT), , , TID AC, Nathen Branham PA-C    levETIRAcetam (KEPPRA) tablet 500 mg, 500 mg, Oral, Q12H [address], Nathen Branham PA-C, 500 mg at 04/18/21 0828    losartan potassium-hydrochlorothiazide (HYZAAR 50/12  5) combination, , Oral, Daily, Clarissa Mon PA-C, Given at 04/18/21 0828    naloxone (NARCAN) 0 04 mg/mL syringe 0 04 mg, 0 04 mg, Intravenous, Q1MIN PRN, Clarissa Mon PA-C    ondansetron (ZOFRAN) injection 4 mg, 4 mg, Intravenous, Q6H PRN, Clarissa Mon PA-C    oxyCODONE (ROXICODONE) IR tablet 2 5 mg, 2 5 mg, Oral, Q4H PRN, Clarissa Mon PA-C    oxyCODONE (ROXICODONE) IR tablet 5 mg, 5 mg, Oral, Q4H PRN, Clarissa Mon PA-C    potassium chloride (K-DUR,KLOR-CON) CR tablet 20 mEq, 20 mEq, Oral, BID, Westley Wu PA-C    pravastatin (PRAVACHOL) tablet 40 mg, 40 mg, Oral, Daily With Heather Pace PA-C, 40 mg at 04/17/21 1716    QUEtiapine (SEROquel) tablet 25 mg, 25 mg, Oral, HS, Westley Wu PA-C, 25 mg at 04/17/21 2206    senna (SENOKOT) tablet 17 2 mg, 2 tablet, Oral, HS PRN, Derick Wu PA-C     Vitals:   Vitals:    04/18/21 0826   BP: (!) 126/103   Pulse: 95   Resp: 16   Temp: 98 2 °F (36 8 °C)   SpO2:        Intake/Output:  I/O       04/16 0701 - 04/17 0700 04/17 0701 - 04/18 0700    P  O   160    Total Intake  160    Urine 78     Stool 2     Total Output 80     Net -80 +160          Unmeasured Urine Occurrence 3 x 3 x    Unmeasured Stool Occurrence  3 x           Nutrition/GI Proph/Bowel Reg: Senna as needed    Physical Exam:   GENERAL APPEARANCE: Patient in no acute distress  HEENT: Posterior scalp laceration; PERRL, EOMs intact; Mucous membranes moist  CV: Regular rate and rhythm; no murmur/gallops/rubs appreciated  CHEST / LUNGS: Clear to auscultation; no wheezes/rales/rhonci  ABD: NABS; soft; non-distended; non-tender  : Voiding  EXT: +2 pulses bilaterally upper & lower extremities; no edema    NEURO: GCS 14, confused; no focal neurologic deficits; neurovascularly intact  SKIN: Warm, dry and well perfused; no rash; no jaundice  Invasive Devices     Peripheral Intravenous Line            Peripheral IV 04/18/21 Dorsal (posterior); Left Hand less than 1 day                 Lab Results:   BMP/CMP:   Lab Results   Component Value Date    SODIUM 136 04/18/2021    K 3 3 (L) 04/18/2021     04/18/2021    CO2 25 04/18/2021    BUN 15 04/18/2021    CREATININE 0 66 04/18/2021    CALCIUM 8 9 04/18/2021    EGFR 77 04/18/2021    and CBC:   Lab Results   Component Value Date    WBC 9 27 04/18/2021    HGB 12 8 04/18/2021    HCT 39 1 04/18/2021    MCV 94 04/18/2021     04/18/2021    MCH 30 7 04/18/2021    MCHC 32 7 04/18/2021    RDW 12 7 04/18/2021    MPV 11 0 04/18/2021    NRBC 0 04/18/2021     Imaging/EKG Studies: Results: I have personally reviewed pertinent reports  Other Studies:   CT head wo contrast   Final Result by Epi Singh MD (04/18 0143)      Stable large right posterior fossa lesion previously noted to be a meningioma with mass effect on the adjacent brainstem and cerebellar tonsillar herniation change  No significant intracranial hemorrhage is seen        Workstation performed: [id number]             VTE Prophylaxis: Sequential compression device (Venodyne)  and Heparin

## 2021-04-18 NOTE — QUICK NOTE
Spoke with patient's son and daughter Andrea Hutson and Claude Ivan) and they were fully updated on patient's recommendations for as needed follow up with Neurosurgery and medically stable for discharge  Claude Loh reports that she has not followed with Neurosurgery for her meningiomas in a long time because patient does not want surgical intervention   Patient lives at [facility] and has been to the rehab portion of [facility] in the past

## 2021-04-18 NOTE — ASSESSMENT & PLAN NOTE
- Hypokalemia, present on admission     - Replete as indicated, K-dur 20 mEq BID started 4/18, wean as necessary  - Continue to monitor while inpatient  - Outpatient follow-up with PCP

## 2021-04-18 NOTE — ASSESSMENT & PLAN NOTE
- Chronic meningiomas again identified on CT scans prior to transfer and on repeat imaging  - Patient follows with [facility] Neurosurgery as an outpatient     - Neurosurgery recommends follow up outpatient with designated provider

## 2021-04-18 NOTE — PLAN OF CARE
Problem: Potential for Falls  Goal: Patient will remain free of falls  Description: INTERVENTIONS:  - Assess patient frequently for physical needs  -  Identify cognitive and physical deficits and behaviors that affect risk of falls    -  [place] fall precautions as indicated by assessment   - Educate patient/family on patient safety including physical limitations  - Instruct patient to call for assistance with activity based on assessment  - Modify environment to reduce risk of injury  - Consider OT/PT consult to assist with strengthening/mobility  Outcome: Progressing     Problem: Prexisting or High Potential for Compromised Skin Integrity  Goal: Skin integrity is maintained or improved  Description: INTERVENTIONS:  - Identify patients at risk for skin breakdown  - Assess and monitor skin integrity  - Assess and monitor nutrition and hydration status  - Monitor labs   - Assess for incontinence   - Turn and reposition patient  - Assist with mobility/ambulation  - Relieve pressure over bony prominences  - Avoid friction and shearing  - Provide appropriate hygiene as needed including keeping skin clean and dry  - Evaluate need for skin moisturizer/barrier cream  - Collaborate with interdisciplinary team   - Patient/family teaching  - Consider wound care consult   Outcome: Progressing     Problem: PAIN - ADULT  Goal: Verbalizes/displays adequate comfort level or baseline comfort level  Description: Interventions:  - Encourage patient to monitor pain and request assistance  - Assess pain using appropriate pain scale  - Administer analgesics based on type and severity of pain and evaluate response  - Implement non-pharmacological measures as appropriate and evaluate response  - Consider cultural and social influences on pain and pain management  - Notify physician/advanced practitioner if interventions unsuccessful or patient reports new pain  Outcome: Progressing     Problem: INFECTION - ADULT  Goal: Absence or prevention of progression during hospitalization  Description: INTERVENTIONS:  - Assess and monitor for signs and symptoms of infection  - Monitor lab/diagnostic results  - Monitor all insertion sites, i e  indwelling lines, tubes, and drains  - Monitor endotracheal if appropriate and nasal secretions for changes in amount and color  - [place] appropriate cooling/warming therapies per order  - Administer medications as ordered  - Instruct and encourage patient and family to use good hand hygiene technique  - Identify and instruct in appropriate isolation precautions for identified infection/condition  Outcome: Progressing  Goal: Absence of fever/infection during neutropenic period  Description: INTERVENTIONS:  - Monitor WBC    Outcome: Progressing     Problem: SAFETY ADULT  Goal: Patient will remain free of falls  Description: INTERVENTIONS:  - Assess patient frequently for physical needs  -  Identify cognitive and physical deficits and behaviors that affect risk of falls    -  [place] fall precautions as indicated by assessment   - Educate patient/family on patient safety including physical limitations  - Instruct patient to call for assistance with activity based on assessment  - Modify environment to reduce risk of injury  - Consider OT/PT consult to assist with strengthening/mobility  Outcome: Progressing  Goal: Maintain or return to baseline ADL function  Description: INTERVENTIONS:  -  Assess patient's ability to carry out ADLs; assess patient's baseline for ADL function and identify physical deficits which impact ability to perform ADLs (bathing, care of mouth/teeth, toileting, grooming, dressing, etc )  - Assess/evaluate cause of self-care deficits   - Assess range of motion  - Assess patient's mobility; develop plan if impaired  - Assess patient's need for assistive devices and provide as appropriate  - Encourage maximum independence but intervene and supervise when necessary  - Involve family in performance of ADLs  - Assess for home care needs following discharge   - Consider OT consult to assist with ADL evaluation and planning for discharge  - Provide patient education as appropriate  Outcome: Progressing  Goal: Maintain or return mobility status to optimal level  Description: INTERVENTIONS:  - Assess patient's baseline mobility status (ambulation, transfers, stairs, etc )    - Identify cognitive and physical deficits and behaviors that affect mobility  - Identify mobility aids required to assist with transfers and/or ambulation (gait belt, sit-to-stand, lift, walker, cane, etc )  - [place] fall precautions as indicated by assessment  - Record patient progress and toleration of activity level on Mobility SBAR; progress patient to next Phase/Stage  - Instruct patient to call for assistance with activity based on assessment  - Consider rehabilitation consult to assist with strengthening/weightbearing, etc   Outcome: Progressing

## 2021-04-18 NOTE — ASSESSMENT & PLAN NOTE
- Closed head injury with small extra-axial injury concerning for and traumatic brain injury/bleed following fall  - Admit to trauma service with HOT protocol   - Neurosurgery evaluation and recommendations appreciated  - Hold anti-platelet and anticoagulant medications for least 2 weeks and/or until cleared by Neurosurgery  Patient was given DDAVP prior to transfer  - Continue Keppra for 7 days for seizure prophylaxis  - Repeat CTH 4/17: No significant intracranial hemorrhage is seen  - May continue Meclizine for dizziness  - Monitor neurologic exam   - Continue symptomatic management with analgesia as needed  - PT and OT evaluation and treatment as indicated    - Outpatient Neurosurgery follow-up for closed head injury not necessary, follow up with current designated [facility]

## 2021-04-18 NOTE — CASE MANAGEMENT
Pt is not a 30 day readmission  Pt is not a bundle  Unplanned readmission risk color- Green  Pt is confused  CM called and spoke with pt's dtr Aby Henry, introduce self and made aware of CM role at dc  Aby Henry reported that pt has a LW and POA  Pt's POA is dtr Aby Elaine- [phone]  Pt resides at [address]  Pt was IPTA with ADL's except showering which she needs 1 assist    Aby Henry reported that pt has hx of multiple falls  Pt has hx with STR at [facility]  Pt was independent with ambulation using a rollator  CM informed Aby Henry that PT/OT's recommendation is IP rehab for pt  Aby Henry preferred referral to [facility], referral in [place]  A post acute care recommendation was made by your care team for STR  Discussed Freedom of Choice with POA  List of facilities given to POA via in phone  POA aware the list is custom filtered for them by preferred and that [facility] post acute providers are designated  CM reviewed d/c planning process including the following: identifying help at home, patient preference for d/c planning needs, Discharge Lounge, [facility] Meds to Bed program, availability of treatment team to discuss questions or concerns patient and/or family may have regarding understanding medications and recognizing signs and symptoms once discharged  CM also encouraged patient to follow up with all recommended appointments after discharge  Patient advised of importance for patient and family to participate in managing patients medical well being

## 2021-04-18 NOTE — ASSESSMENT & PLAN NOTE
Lab Results   Component Value Date    HGBA1C 7 6 (H) 11/27/2019     - SSI  - Resume home medication therapy on discharge  - Outpatient follow-up with PCP

## 2021-04-18 NOTE — ASSESSMENT & PLAN NOTE
- Status post fall with the below noted injuries  - Fall precautions  - Geriatric Medicine consultation for evaluation, medication review and recommendations   - PT and OT evaluation and treatment as indicated, Recommending disposition to post acute rehabilitation   - Case Management consultation for disposition planning

## 2021-04-19 VITALS
HEIGHT: 62 IN | TEMPERATURE: 98 F | HEART RATE: 92 BPM | OXYGEN SATURATION: 96 % | BODY MASS INDEX: 25.44 KG/M2 | DIASTOLIC BLOOD PRESSURE: 65 MMHG | SYSTOLIC BLOOD PRESSURE: 129 MMHG | RESPIRATION RATE: 17 BRPM | WEIGHT: 138.23 LBS

## 2021-04-19 LAB
GLUCOSE SERPL-MCNC: 152 MG/DL (ref 65–140)
GLUCOSE SERPL-MCNC: 169 MG/DL (ref 65–140)

## 2021-04-19 PROCEDURE — 97535 SELF CARE MNGMENT TRAINING: CPT

## 2021-04-19 PROCEDURE — 99223 1ST HOSP IP/OBS HIGH 75: CPT | Performed by: INTERNAL MEDICINE

## 2021-04-19 PROCEDURE — NC001 PR NO CHARGE: Performed by: SURGERY

## 2021-04-19 PROCEDURE — 99238 HOSP IP/OBS DSCHRG MGMT 30/<: CPT | Performed by: PHYSICIAN ASSISTANT

## 2021-04-19 PROCEDURE — 82948 REAGENT STRIP/BLOOD GLUCOSE: CPT

## 2021-04-19 RX ORDER — LEVETIRACETAM 500 MG/1
500 TABLET ORAL EVERY 12 HOURS SCHEDULED
Qty: 7 TABLET | Refills: 0 | Status: SHIPPED | OUTPATIENT
Start: 2021-04-19 | End: 2021-04-23

## 2021-04-19 RX ADMIN — LOSARTAN POTASSIUM: 50 TABLET, FILM COATED ORAL at 09:34

## 2021-04-19 RX ADMIN — ACETAMINOPHEN 975 MG: 325 TABLET ORAL at 05:38

## 2021-04-19 RX ADMIN — LEVETIRACETAM 500 MG: 500 TABLET, FILM COATED ORAL at 09:34

## 2021-04-19 RX ADMIN — HEPARIN SODIUM 5000 UNITS: 5000 INJECTION INTRAVENOUS; SUBCUTANEOUS at 05:44

## 2021-04-19 RX ADMIN — POTASSIUM CHLORIDE 20 MEQ: 1500 TABLET, EXTENDED RELEASE ORAL at 09:34

## 2021-04-19 RX ADMIN — INSULIN LISPRO 1 UNITS: 100 INJECTION, SOLUTION INTRAVENOUS; SUBCUTANEOUS at 09:37

## 2021-04-19 RX ADMIN — AMLODIPINE BESYLATE 5 MG: 5 TABLET ORAL at 09:34

## 2021-04-19 NOTE — ASSESSMENT & PLAN NOTE
- Hypokalemia, present on admission s/p repletion  - Repeat BMP in AM  - Outpatient follow-up with PCP

## 2021-04-19 NOTE — PLAN OF CARE
Problem: Nutrition/Hydration-ADULT  Goal: Nutrient/Hydration intake appropriate for improving, restoring or maintaining nutritional needs  Description: Monitor and assess patient's nutrition/hydration status for malnutrition  Collaborate with interdisciplinary team and initiate plan and interventions as ordered  Monitor patient's weight and dietary intake as ordered or per policy  Utilize nutrition screening tool and intervene as necessary  Determine patient's food preferences and provide high-protein, high-caloric foods as appropriate       INTERVENTIONS:  - Monitor oral intake, urinary output, labs, and treatment plans  - Assess nutrition and hydration status and recommend course of action  - Evaluate amount of meals eaten  - Assist patient with eating if necessary   - Allow adequate time for meals  - Recommend/ encourage appropriate diets, oral nutritional supplements, and vitamin/mineral supplements  - Order, calculate, and assess calorie counts as needed  - Recommend, monitor, and adjust tube feedings and TPN/PPN based on assessed needs  - Assess need for intravenous fluids  - Provide specific nutrition/hydration education as appropriate  - Include patient/family/caregiver in decisions related to nutrition  Outcome: Not Progressing   Patient has poor po intake

## 2021-04-19 NOTE — PROGRESS NOTES
[address] [facility]  Progress Note - Manuel Desir 10/27/1928, 80 y o  female MRN: [id number]  Unit/Bed#: [facility] 601-01 Encounter: [id number]  Primary Care Provider: No primary care provider on file  Date and time admitted to hospital: 4/16/2021  3:43 PM    Hypokalemia  Assessment & Plan  - Hypokalemia, present on admission s/p repletion  - Repeat BMP in AM  - Outpatient follow-up with PCP  Scalp laceration, initial encounter  Assessment & Plan  - Left posterior scalp laceration not requiring intervention     - Local wound care as indicated  - Analgesia as needed  - Tetanus vaccine updated    Fall  Assessment & Plan  - Status post fall with the below noted injuries  - Fall precautions  - Geriatric Medicine consultation for evaluation, medication review and recommendations   - PT and OT evaluation and treatment as indicated, Recommending disposition to post acute rehabilitation   - Case Management consultation for disposition planning  DMII (diabetes mellitus, type 2) ([facility] [address] )  Assessment & Plan    Lab Results   Component Value Date    HGBA1C 7 6 (H) 11/27/2019     - Lantus with SSI coverage, goal -180  - Resume home medication therapy on discharge  - Outpatient follow-up with PCP  Meningioma [facility])  Assessment & Plan  - Chronic meningiomas again identified on CT scans prior to transfer and on repeat imaging  - Patient follows with [facility] Neurosurgery as an outpatient     - Neurosurgery recommends follow up outpatient with designated provider      Benign essential hypertension  Assessment & Plan  - Continue home medication regimen     - Outpatient follow-up with PCP  * Closed head injury  Assessment & Plan  - Closed head injury with small extra-axial injury concerning for and traumatic brain injury/bleed following fall   - Neurosurgery evaluation and recommendations appreciated    - Hold home plavix for 2 wks following stable scan (4/17)  - Continue Keppra for 7 days for seizure prophylaxis currently day 4/7  - Repeat CTH 4/17: No significant intracranial hemorrhage is seen  - May continue Meclizine for dizziness  - Monitor neurologic exam   - Continue symptomatic management with analgesia as needed  - PT and OT recommend STR placement  - Outpatient Neurosurgery follow-up for closed head injury not necessary, follow up with current designated [facility] Neurosurgery office or [facility] for known meningioma diagnosis  Family states that patient has stopped following with outpatient Nsx as she does not want surgery            Disposition: med surg, awaiting STR placement       SUBJECTIVE:  Chief Complaint: "I'm cold"    Subjective: Patient offers no complaints this AM  Denies headache/dizziness  Pleasantly confused      OBJECTIVE:     Meds/Allergies     Current Facility-Administered Medications:     acetaminophen (TYLENOL) tablet 975 mg, 975 mg, Oral, Q8H [address], Nathen Branham PA-C, 975 mg at 04/19/21 0538    amLODIPine (NORVASC) tablet 5 mg, 5 mg, Oral, Daily, Nathen Branham PA-C, 5 mg at 04/18/21 6463    heparin (porcine) subcutaneous injection 5,000 Units, 5,000 Units, Subcutaneous, Q8H [address], Westley Wu PA-C, 5,000 Units at 04/19/21 0544    insulin glargine (LANTUS) subcutaneous injection 9 Units 0 09 mL, 9 Units, Subcutaneous, HS, Nathen Branham PA-C, 9 Units at 04/18/21 2133    insulin lispro (HumaLOG) 100 units/mL subcutaneous injection 1-5 Units, 1-5 Units, Subcutaneous, HS, Nathen Branham PA-C, 1 Units at 04/18/21 2129    insulin lispro (HumaLOG) 100 units/mL subcutaneous injection 1-5 Units, 1-5 Units, Subcutaneous, TID AC, 1 Units at 04/18/21 1704 **AND** Fingerstick Glucose (POCT), , , TID AC, Nathen Branham PA-C    levETIRAcetam (KEPPRA) tablet 500 mg, 500 mg, Oral, Q12H [address], Gin R ADAM Luque, 500 mg at 04/18/21 2130    losartan potassium-hydrochlorothiazide (HYZAAR 50/12  5) combination, , Oral, Daily, Shyanne Hernandez PA-C, Given at 04/18/21 7164    naloxone (NARCAN) 0 04 mg/mL syringe 0 04 mg, 0 04 mg, Intravenous, Q1MIN PRN, Nathen Branham PA-C    ondansetron (ZOFRAN) injection 4 mg, 4 mg, Intravenous, Q6H PRN, Martín Crow PA-C    oxyCODONE (ROXICODONE) IR tablet 2 5 mg, 2 5 mg, Oral, Q4H PRN, Martín Crow PA-C    oxyCODONE (ROXICODONE) IR tablet 5 mg, 5 mg, Oral, Q4H PRN, Martín Crow PA-C    potassium chloride (K-DUR,KLOR-CON) CR tablet 20 mEq, 20 mEq, Oral, BID, Westley Wu PA-C, 20 mEq at 04/18/21 1704    pravastatin (PRAVACHOL) tablet 40 mg, 40 mg, Oral, Daily With Dinner, Nathen Branham PA-C, 40 mg at 04/18/21 1704    QUEtiapine (SEROquel) tablet 25 mg, 25 mg, Oral, HS, Westley Wu PA-C, 25 mg at 04/18/21 2130    senna (SENOKOT) tablet 17 2 mg, 2 tablet, Oral, HS PRN, Benji Wu PA-C     Vitals:   Vitals:    04/19/21 0717   BP: 129/65   Pulse:    Resp: 17   Temp:    SpO2:        Intake/Output:  I/O       04/17 0701 - 04/18 0700 04/18 0701 - 04/19 0700 04/19 0701 - 04/20 0700    P  O  160 480     Total Intake 160 480     Urine 129      Stool       Total Output 129      Net +31 +480            Unmeasured Urine Occurrence 3 x 3 x     Unmeasured Stool Occurrence 3 x 1 x            Nutrition/GI Proph/Bowel Reg: regular carb controlled diet     Physical Exam:   GENERAL APPEARANCE: Frail appearing   NEURO: AAO to self and situation only, GCS 14 (confusion)  HEENT: PERRLA  CV: RRR  LUNGS: CTAB  GI: soft, non-tender      Invasive Devices     Peripheral Intravenous Line            Peripheral IV 04/18/21 Dorsal (posterior); Left Hand 1 day                 Lab Results: Results: I have personally reviewed pertinent reports     and I have personally reviewed pertinent films in PACS    VTE Prophylaxis: Heparin

## 2021-04-19 NOTE — ASSESSMENT & PLAN NOTE
- Chronic meningiomas again identified on CT scans prior to transfer and on repeat imaging  - Patient follows with [facility] Neurosurgery as an outpatient     - Neurosurgery recommends follow up outpatient with designated provider, patient and family decline follow-up, information provided should they change their minds

## 2021-04-19 NOTE — ASSESSMENT & PLAN NOTE
Lab Results   Component Value Date    HGBA1C 7 6 (H) 11/27/2019     - Lantus with SSI coverage, goal -180  - Resume home medication therapy on discharge  - Outpatient follow-up with PCP

## 2021-04-19 NOTE — ASSESSMENT & PLAN NOTE
- Chronic meningiomas again identified on CT scans prior to transfer and on repeat imaging  - Patient follows with [facility] Neurosurgery as an outpatient     - Neurosurgery recommends follow up outpatient with designated provider

## 2021-04-19 NOTE — OCCUPATIONAL THERAPY NOTE
Occupational Therapy Treatment Note:       04/19/21 1300   OT Last Visit   OT Visit Date 04/19/21   Restrictions/Precautions   Other Precautions Cognitive; Chair Alarm; Bed Alarm; Fall Risk   Pain Assessment   Pain Assessment Tool FLACC   Pain Rating: FLACC (Rest) - Face 0   Pain Rating: FLACC (Rest) - Legs 0   Pain Rating: FLACC (Rest) - Activity 0   Pain Rating: FLACC (Rest) - Cry 0   Pain Rating: FLACC (Rest) - Consolability 0   Score: FLACC (Rest) 0   Pain Rating: FLACC (Activity) - Face 1   Pain Rating: FLACC (Activity) - Legs 1   Pain Rating: FLACC (Activity) - Activity 1   Pain Rating: FLACC (Activity) - Cry 1   Pain Rating: FLACC (Activity) - Consolability 0   Score: FLACC (Activity) 4   ADL   Where Assessed Chair   Eating Assistance 3  Moderate Assistance   Eating Comments pt required asst to drink coffee, and eat finger food muffin  pt with + spillage of food  Grooming Assistance 4  Minimal Assistance   LB Dressing Assistance 2  Maximal Assistance   Toileting Assistance  1  Total Assistance   Toileting Comments incontinentbowel   Functional Standing Tolerance   Time poor plus balance   Bed Mobility   Supine to Sit 3  Moderate assistance   Additional items   (ax2)   Transfers   Sit to Stand 3  Moderate assistance   Additional items Assist x 2   Stand to Sit 4  Minimal assistance   Additional items Assist x 2   Stand pivot 3  Moderate assistance   Additional items Assist x 2  (pt very fearful of rw)   Cognition   Overall Cognitive Status Impaired   Arousal/Participation Alert   Attention Attends with cues to redirect   Memory Decreased short term memory;Decreased recall of recent events;Decreased recall of precautions   Following Commands Follows one step commands without difficulty   Comments pt noted to only eat muffin for breakfast and then began with cupcake for lunch    pt was able to feed self  after mod set up and instruction   Activity Tolerance   Activity Tolerance Patient tolerated treatment well Assessment   Assessment pt participated  in am ot session and was seen focusing on bed mobility, self feeding, adls, spt with asst x 2  pt was noted to tolerate sitting oob for several hours before needing to berepositioned for lunch in chair  pt was incont of bowel and was unaware of same  pt was physically cooperative but verbally frustrated with oob  pt required ta for bowel movement clean up  will folllw focusing on goals from ie  Plan   Treatment Interventions ADL retraining;Functional transfer training; Endurance training;Cognitive reorientation;Equipment evaluation/education   Goal Expiration Date 05/01/21   OT Treatment Day 1   OT Frequency 3-5x/wk   Recommendation   OT Discharge Recommendation Post acute rehabilitation services   OT - OK to Discharge Yes   AM-PAC Daily Activity Inpatient   Lower Body Dressing 2   Bathing 2   Toileting 2   Upper Body Dressing 2   Grooming 2   Eating 2   Daily Activity Raw Score 12   Daily Activity Standardized Score (Calc for Raw Score >=11) 30 6   AM-PAC Applied Cognition Inpatient   Following a Speech/Presentation 2   Understanding Ordinary Conversation 3   Taking Medications 1   Remembering Where Things Are Placed or Put Away 1   Remembering List of 4-5 Errands 1   Taking Care of Complicated Tasks 1   Applied Cognition Raw Score 9   Applied Cognition Standardized Score 22 48   April A Martín Melecio

## 2021-04-19 NOTE — ASSESSMENT & PLAN NOTE
- Closed head injury with small extra-axial injury concerning for and traumatic brain injury/bleed following fall   - Neurosurgery evaluation and recommendations appreciated  - Hold home plavix for 2 wks following stable scan (4/17)  - Continue Keppra for 7 days for seizure prophylaxis currently day 4/7  - Repeat CTH 4/17: No significant intracranial hemorrhage is seen  - May continue Meclizine for dizziness  - Monitor neurologic exam   - Continue symptomatic management with analgesia as needed  - PT and OT recommend STR placement  - Outpatient Neurosurgery follow-up for closed head injury not necessary, follow up with current designated [facility] Neurosurgery office or [facility] for known meningioma diagnosis   Family states that patient has stopped following with outpatient Nsx as she does not want surgery

## 2021-04-19 NOTE — DISCHARGE SUMMARY
[address] [facility]  Discharge- Néstor Walker 10/27/1928, 80 y o  female MRN: [id number]  Unit/Bed#: [facility] 601-01 Encounter: [id number]  Primary Care Provider: No primary care provider on file  Date and time admitted to hospital: 4/16/2021  3:43 PM    Hypokalemia  Assessment & Plan  - Hypokalemia, present on admission s/p repletion  - Outpatient follow-up with PCP  Scalp laceration, initial encounter  Assessment & Plan  - Left posterior scalp laceration not requiring intervention     - Local wound care as indicated  - Analgesia as needed  - Tetanus vaccine updated    Fall  Assessment & Plan  - Status post fall with the below noted injuries  - Fall precautions  - Geriatric Medicine consultation for evaluation, medication review and recommendations   - PT and OT evaluation and treatment as indicated, Recommending disposition to post acute rehabilitation   - Case Management consultation for disposition planning  DMII (diabetes mellitus, type 2) ([facility] [address] )  Assessment & Plan    Lab Results   Component Value Date    HGBA1C 7 6 (H) 11/27/2019     - Lantus with SSI coverage, goal -180  - Resume home medication therapy on discharge  - Outpatient follow-up with PCP  Meningioma [facility])  Assessment & Plan  - Chronic meningiomas again identified on CT scans prior to transfer and on repeat imaging  - Patient follows with [facility] Neurosurgery as an outpatient     - Neurosurgery recommends follow up outpatient with designated provider, patient and family decline follow-up, information provided should they change their minds      Benign essential hypertension  Assessment & Plan  - Continue home medication regimen     - Outpatient follow-up with PCP  * Closed head injury  Assessment & Plan  - Closed head injury with small extra-axial injury concerning for and traumatic brain injury/bleed following fall   - Neurosurgery evaluation and recommendations appreciated    - Hold home plavix for 2 wks following stable scan (4/17)  - Continue Keppra for 7 days for seizure prophylaxis currently day 4/7  - Repeat CTH 4/17: No significant intracranial hemorrhage is seen  - May continue Meclizine for dizziness  - Monitor neurologic exam   - Continue symptomatic management with analgesia as needed  - PT and OT recommend STR placement  - Outpatient Neurosurgery follow-up for closed head injury not necessary, follow up with current designated [facility] Neurosurgery office or [facility] for known meningioma diagnosis  Family states that patient has stopped following with outpatient Nsx as she does not want surgery                  Resolved Problems  Date Reviewed: 4/19/2021    None          Admission Date:   Admission Orders (From admission, onward)     Ordered        04/17/21 1420  Inpatient Admission  Once         04/16/21 1823  Place in Observation  Once                     Admitting Diagnosis: Intracranial hemorrhage (HCC) [I62 9]  Closed head injury, initial encounter [S09 90XA]  Scalp laceration, initial encounter [S01 01XA]    HPI: 80 y o  female who presents As a transfer from [facility]  She initially presented there following a fall this morning during which she struck her head  She denied losing consciousness  She does admit to taking Plavix daily  Her only complaint on presentation initially was a headache from a cut she obtained on the back of her head  Her initial workup was negative for acute traumatic injuries aside from her scalp laceration  Prior to discharge from the emergency department at [facility], the patient developed some dizziness and vomited prompting further workup and a repeat CT scan of the head revealing concern for traumatic brain injury prompting her transfer to [facility]  At this time, she states she feels well other than just a minimal headache and discomfort at the site of her left scalp laceration  She offers no other complaints at this time  She notes that she has eaten since her episode of emesis earlier today with no further nausea or dizziness  Procedures Performed: No orders of the defined types were placed in this encounter  Summary of Hospital Course: Patient was admitted to the hospital for close neuro monitoring  She had a repeat CTH with improvement in extra-axial hemorrhage  The patient has remained stable  She was evaluated by Nsx who recommended outpatient f/u however the patient/family declined as she does not want surgical intervention for her meningiomas  She will be discharged back to [facility] this afternoon     Significant Findings, Care, Treatment and Services Provided: NA    Complications: NA    Condition at Discharge: good         Discharge instructions/Information to patient and family:   See after visit summary for information provided to patient and family  Provisions for Follow-Up Care:  See after visit summary for information related to follow-up care and any pertinent home health orders  PCP: No primary care provider on file  Disposition: Skilled nursing facility at [facility] Readmission: No    Discharge Statement   I spent 30 minutes discharging the patient  This time was spent on the day of discharge  I had direct contact with the patient on the day of discharge  Additional documentation is required if more than 30 minutes were spent on discharge  Discharge Medications:  See after visit summary for reconciled discharge medications provided to patient and family

## 2021-04-19 NOTE — CASE MANAGEMENT
Pt accepted to [facility] for SNF  Pt will d/c there today @1400 via [facility]  Pt's dtr Arnulfo Villanueva made aware and in agreement

## 2021-04-19 NOTE — DISCHARGE INSTRUCTIONS
Meningioma   WHAT YOU NEED TO KNOW:   A meningioma is a tumor that starts in the meninges of the brain and spinal cord  The meninges are the tissues that cover the brain and spinal cord  They prevent germs and other substances from entering the brain and spinal cord  Most meningiomas are slow-growing and benign (not cancer)  DISCHARGE INSTRUCTIONS:   Medicines:   · Medicines  may be given to kill the tumor cells and decrease the size of the meningioma  · Take your medicine as directed  Contact your healthcare provider if you think your medicine is not helping or if you have side effects  Tell him or her if you are allergic to any medicine  Keep a list of the medicines, vitamins, and herbs you take  Include the amounts, and when and why you take them  Bring the list or the pill bottles to follow-up visits  Carry your medicine list with you in case of an emergency  Follow up with your healthcare provider or surgeon as directed:  Write down your questions so you remember to ask them during your visits  Self-care:   · Drink liquids as directed  Ask how much liquid to drink each day and which liquids are best for you  If you have nausea or diarrhea from treatment, extra liquids may help decrease your risk for dehydration  · Eat healthy foods  Healthy foods include fruits, vegetables, whole-grain breads, low-fat dairy products, beans, lean meats, and fish  This may help you feel better during treatment and decrease side effects  You may need to change what you eat during treatment  Do not eat foods or drink liquids that cause gas, such as cabbage, beans, onions, or soft drinks  A nutritionist may help to plan the best meals and snacks for you  · Exercise  Ask about the best exercise plan for you  Exercise may improve your energy levels and appetite  Contact your healthcare provider if:   · You have a fever  · You vomit repeatedly, and cannot keep any food or liquids down      · You have a severe headache, or you feel dizzy  · You have questions or concerns about your condition or care  Return to the emergency department if:   · You have any of the following signs of a stroke:      ? Numbness or drooping on one side of your face     ? Weakness in an arm or leg    ? Confusion or difficulty speaking    ? Dizziness, a severe headache, or vision loss      © Copyright [address] Information is for End User's use only and may not be sold, redistributed or otherwise used for commercial purposes  All illustrations and images included in CareNotes® are the copyrighted property of [employer]  or [facility]   The above information is an  only  It is not intended as medical advice for individual conditions or treatments  Talk to your doctor, nurse or pharmacist before following any medical regimen to see if it is safe and effective for you

## 2021-04-19 NOTE — CONSULTS
Consultation - Geriatric Medicine   Liv Blackwood 80 y o  female MRN: [id number]  Unit/Bed#: [facility] 601-01 Encounter: [id number]      Assessment/Plan     Ambulatory dysfunction with fall  -reportedly mechanical fall at long term care facility  -reported head strike with no loss of consciousness  -patient maintained on anti-platelet with Plavix at baseline (hx CVA), currently on hold since fall   -history recurrent falls  -requires use 4 wheeled rolling walker for ambulation at baseline  -continue to encourage good body mechanics and assist with transfers  -keep personal items and call bell close to prevent reaching  -encourage appropriate footwear adequate lighting at all times when out of bed  -PT and OT following, recommend short-term, facility requests referral back to [facility] for rehab as pt familiar with staff and facility, CM notified of request      Closed head injury  -left mixed density extra-axial hemorrhage reported on CT head 4/16/2021  -Plavix on hold for 1-2 weeks as long as CTH remains stable per Nsx  -Neurosurgery following, recommend OT for concussion rehab   -Neuro checks per protocol  -recommend limiting use of Seroquel given increased risk intracerebral hemorrhage in patients with underlying cognitive impairment (no formal dementia at baseline but suspicious for at least MCI vs mild dementia given hx obtained from care facility), Keppra may contribute to confusion and irritability in elderly patients-recommend lowest dose for shortest duration possible given may increase confusion and irritability and may contribute to prescribing cascade if using Seroquel to tx effect of keppra, if continues to require Seroquel recommend frequent de-prescribing  re-evaluations    Scalp laceration  -no signs active bleeding   -continue local wound care per trauma    Acute pain due to trauma  -recommend pain control per Geriatric pain protocol:  Tylenol 975mg Q8H scheduled  Roxicodone 2 5mg Q4H PRN moderate pain  Roxicodone 5mg Q4H PRN severe pain  Dilaudid 0 2mg Q4H PRN  -consider adjuncts such as Gabapentin 100mg HS and lidocaine patch topically  -encourage addition of non-pharmacologic pain treatment including ice and frequent repositioning  -recommend  bowel regimen to prevent and treat constipation due to increased risk with acute pain and opiate pain medications    Meningioma  -previously identified as follows with [facility] for management  -[facility] 4/16/21 radiology reports note increase in size from prior imaging reports  -Neurosurgery reports no intervention anticipated inpatient and recommend return to [facility] for follow-up    Cognitive screening  -no prior cognitive screening on record, suspect at least MCI vs mild dementia at baseline per hx  -[facility] 4/16/2021 revealed no acute intracranial abnormality however on personal review revealed mild chronic microangiopathic changes  -TSH 1 36 (2019), recommend rechecking   -currently resides in personal care facility and would benefit from return to familiar environment  -per care facility at baseline is pleasantly confused at times but is able to remain moslty independent with supervision and assistance of ADLs, she has no hx agitation or behaviors   -continue to encourage patient remain physically, socially, and cognitively active and engaged to maintain cognitive acuity, she is typically very social at her facility which is extremely beneficial to her overall and cognitive wellbeing   -continue to adhere to patients daily routine to limit disruptions as they may contribute to confusion and distress in frail elderly individual    Impaired Vision  -recommend use of corrective lenses at all appropriate times  -encourage adequate lighting and encourage use of assistance with ambulation  -keep personal belongings close to person to avoid reaching  -encourage appropriate footwear at all times    DM-II  -A1c 7 6 (2019), recommend rechecking   -currently at goal glu during hospitalization 140-180 despite reduced dose of Lantus dosing from 27U at care facility to 9U with ISS during hospitalization  -consider continued reduced dosing Lantus to remain in goal glu as above, recommend close o/p f/u with PCP for ongoing titration as goal A1c approx 8 given age and co-morbidities     Vitamin D deficiency  -continue to encourage well balanced diet rich in calcium and vit d and continue vitamin d supplementation 1000 units daily    Deconditioning/debility/frailty  -clinical frailty scale stage 5, mildly frail  -multifactorial including age, ambulatory dysfunction recurrent falls, history CVA, multiple meningiomas, diabetes and multiple chronic medical comorbidities  -albumin 3 8, continue to encourage well-balanced nutrition  -continue optimization chronic medical co-morbidities    Delirium precautions  -Patient is high risk of delirium due to age, fall, traumatic injury, acute pain, unfamiliar environment, hospitalization  -Initiate delirium precautions  -maintain patients normal sleep/wake cycle-typically in bed by 6/7p and out of bed by 6/7a  -minimize overnight interruptions, group overnight vitals/labs/nursing checks as possible  -dim lights, close blinds and turn off tv to minimize stimulation and encourage sleep environment in evenings  -ensure that pain is well controlled  -monitor for fecal and urinary retention which may precipitate delirium  -encourage early mobilization and ambulation with assistance  -provide frequent reorientation and redirection as indicated and appropriate  -limit use of medications which may precipitate or worsen delirium such as tramadol, benzodiazepine, anticholinergics, and benadryl and monitor mentation with any medication regimen changes  -encourage hydration and nutrition   -redirect unwanted behaviors as first line, avoid physical restraints    Home medication review  Personally confirmed with medication list obtained from [address] [place] [address] Facility:    Diet:  Consistent carbohydrate diet  Code status at facility: DNR  Oxygen 2 L via NC PRN  Acetaminophen 650 mg Q4H PRN  Amlodipine 5 mg daily  Atorvastatin 10 mg daily  Clopidogrel 75 mg daily  Famotidine 20 mg BID  Fish oil 2 G daily  Lantus 27U (twenty seven) daily at noon  Losartan-HCTZ 50-12 5 mg daily  Meclizine 12 5 BID  (scheduled not PRN)  Metformin 1000 mg daily with breakfast  Nystatin powder topically to groin BID as needed  Vitamin-D 1000 units daily  Multivitamin daily    Care Coordination: Rounded with Heron Crouch (RN)      History of Present Illness   Physician Requesting Consult: Asim Dunn MD  Reason for Consult / Principal Problem: Fall   Hx and PE limited by: N/A  Additional history obtained from:  Chart review and patient evaluation and personally spoke with patients nurse Molly Lam) at [address] [place] [address]     HPI: Angelica Dwyer is a 80y o  year old female with hypertension, BPPV, hyperlipidemia, DM-II, GERD, ambulatory dysfunction with recurrent falls, meningioma, vitamin-D deficiency, hx CVA, and impaired vision who is admitted to the trauma service with ambulatory dysfunction and fall, she is being seen in consultation by Geriatrics for high risk of developing delirium during hospitalization  She is seen and examined at bedside where she is resting comfortably  She is pleasantly confused, she tells me that she had a fall, she reports that with mechanical and she struck her head denies loss consciousness although she notes significant amount of blood on the ground next to her when the fall occurred, she was unable to provide further details and thus they were obtained from the care team at her personal care facility  Per her care team the fall was reportedly mechanical in nature on 4/16/2020 when the patient was standing at her room door and turned to get her walker and lost her balance falling to the ground striking her head    She requires use of rolling walker for ambulation at baseline and has history of recurrent falls  She is usually pleasantly confused with some word finding difficulty, she is typically very social and enjoys spends most of the day at  interacting with other individuals from her unit or engaged in activities of the facility, she is usually very pleasant and has no history of agitation or behavior disturbance  She prefers to stick to a very particular schedule and prefers to be in bed by latest 6 or 7p and is typically up by 6 or 7a in the mornings  She wears glasses but does not use hearing aid or dentures  Her  requests that if she is recommended for therapy either physical, occupational, or cognitive that she be referred back to [facility] where it can be performed in the skilled nursing side of her facility as she is already familiar with the teams and staff there, this information was personally passed on to the on duty   Inpatient consult to Gerontology  Consult performed by: Christian Valdez DO  Consult ordered by: Garry Rouse PA-C        Review of Systems   Constitutional: Negative for appetite change (not hungry for breakfast but states that she is not big eater anymore), chills and fever  HENT: Negative for dental problem, hearing loss and trouble swallowing  Eyes: Positive for visual disturbance (Wears glasses)  Respiratory: Negative for cough, chest tightness, shortness of breath and wheezing  Cardiovascular: Negative for chest pain, palpitations and leg swelling  Gastrointestinal: Negative for abdominal pain, constipation, nausea and vomiting  Endocrine: Negative  Genitourinary: Negative  Musculoskeletal: Positive for gait problem ( uses walker for ambulation at baseline)  Skin:        Cut on head from fall   Allergic/Immunologic: Negative  Neurological: Negative for dizziness, weakness, light-headedness, numbness and headaches  Hematological: Negative  Psychiatric/Behavioral: Negative for sleep disturbance  The patient is not nervous/anxious  All other systems reviewed and are negative  Historical Information   Past Medical History:   Diagnosis Date    Cognitive decline     Diabetes mellitus ([facility] [address] )     Dyslipidemia     History of CVA (cerebrovascular accident)     Hypertension     [facility]ioma ([facility] )      Past Surgical History:   Procedure Laterality Date    APPENDECTOMY      HYSTERECTOMY      HYSTERECTOMY      THYROIDECTOMY      NEAR-TOTAL    TONSILLECTOMY AND ADENOIDECTOMY      TONSILLECTOMY AND ADENOIDECTOMY       Social History   Social History     Substance and Sexual Activity   Alcohol Use No    Comment: (HISTORY)     Social History     Substance and Sexual Activity   Drug Use No     Social History     Tobacco Use   Smoking Status Never Smoker   Smokeless Tobacco Never Used     Family History:   Family History   Problem Relation Age of Onset    Diabetes Mother     Other Mother         LIVER TUMOR    Cancer Family     Diabetes Family     Hypertension Family      Meds/Allergies   all current active meds have been reviewed    Allergies   Allergen Reactions    Aspartame - Food Allergy Other (See Comments)     diarrhea     Objective     Intake/Output Summary (Last 24 hours) at 4/19/2021 0745  Last data filed at 4/18/2021 1839  Gross per 24 hour   Intake 420 ml   Output --   Net 420 ml     Invasive Devices     Peripheral Intravenous Line            Peripheral IV 04/18/21 Dorsal (posterior); Left Hand 1 day              Physical Exam  Vitals signs and nursing note reviewed  Constitutional:       General: She is not in acute distress  Appearance: Normal appearance  She is not toxic-appearing  Comments: Elderly appearing female lying in bed resting comfortably, no acute distress   HENT:      Head: Normocephalic        Comments: Scalp lesion left temporal area with dried blood surrounding area, no sign of active bleeding     Nose: Nose normal       Mouth/Throat:      Mouth: Mucous membranes are moist       Comments: Dentition intact  Eyes:      General: No scleral icterus  Right eye: No discharge  Left eye: No discharge  Conjunctiva/sclera: Conjunctivae normal       Comments: Pupils are equal and round   Neck:      Musculoskeletal: Neck supple  Comments: Trachea midline, phonation normal  Cardiovascular:      Rate and Rhythm: Normal rate  Pulses: Normal pulses  Heart sounds: No murmur  Pulmonary:      Effort: No respiratory distress  Breath sounds: No wheezing  Abdominal:      General: Bowel sounds are normal       Palpations: Abdomen is soft  Tenderness: There is no abdominal tenderness  Musculoskeletal:      Right lower leg: No edema  Left lower leg: No edema  Comments: Diffuse subcutaneous muscle wasting, central obesity   Skin:     General: Skin is warm and dry  Comments: Left scalp lesion with dried matted blood    Neurological:      Mental Status: She is alert        Comments: Awake and alert oriented to person and place in general as hospital, uncertain of year, month, season, or situation although later in conversation can state she had a fall (per care facility is baseline)   Psychiatric:      Comments: Pleasant and cooperative       Lab Results:   I have personally reviewed pertinent lab results including the following:  -sodium 136, potassium 3 3, chloride 106, CO2 25, BUN 15 creatinine 0 66, glucose 137, calcium 8 9, GFR 77  -magnesium 1 8  -Troponin < 0 2 x2  -hemoglobin 12 8, hematocrit 39 1, WBC 9 27, platelets 961  -hemoglobin A1c 7 6 (11/19)  -TSH 1 36 (2019)   -Influenza A/B/RSV-SARS-COV-2:  Negative on 4/16/2021    I have personally reviewed the following imaging study reports in PACS:    CT head without contrast 4/16/2021:  Cerebral atrophy with chronic small-vessel ischemic white matter disease, no acute intracranial abnormality, multiple extra-axial soft tissue masses several which are calcified consistent with multiple meningiomas, largest lesion in posterior fossa on right, lesion increased in size from prior outside reports, mass effect on 4th ventricle with mild amount surrounding vasogenic edema, soft tissue swelling overlying left parietal convexity, underlying bony calvarium intact  CT C-spine without contrast 4/16/2021:  Degenerative changes cervical spine, no acute cervical spine fracture or traumatic malalignment  CT head without contrast 4/17/2021:  Stable large right posterior fossa lesion previously noted to be meningioma with mass effect adjacent brainstem cerebellar tonsillar herniation change, no significant intracranial hemorrhage    Therapies:   PT: Following   OT: Following     VTE Prophylaxis: Heparin    Code Status: Level 3 - DNAR and DNI  Advance Directive and Living Will:      Power of :    POLST:      Family and Social Support:   Freedom of Choice: Yes  CM Handoff Comments: 4/18 medivcally clear fro dc  fall, closed head injury, fm [address], referral to [address] [place] [address] SNF in [facility]  1 assist with showering only, RW, transport tbd    Goals of Care: Watch leave it to [place] and have some more people to talk to today

## 2021-04-19 NOTE — TRANSPORTATION MEDICAL NECESSITY
Section I - General Information    Name of Patient: Bienvenido Deluna                 : 10/27/1928    Medicare #: [id number]  Transport Date: 21 (PCS is valid for round trips on this date and for all repetitive trips in the 60-day range as noted below )  Origin: [address] [facility]                                                         Destination: [facility]  Is the pt's stay covered under Medicare Part A (PPS/DRG)   [x]     Closest appropriate facility? If no, why is transport to more distant facility required? Yes  If hospice pt, is this transport related to pt's terminal illness? No       Section II - Medical Necessity Questionnaire  Ambulance transportation is medically necessary only if other means of transport are contraindicated or would be potentially harmful to the patient  To meet this requirement, the patient must either be "bed confined" or suffer from a condition such that transport by means other than ambulance is contraindicated by the patient's condition  The following questions must be answered by the medical professional signing below for this form to be valid:    1)  Describe the MEDICAL CONDITION (physical and/or mental) of this patient AT [address] [place] [address] that requires the patient to be transported in an ambulance and why transport by other means is contraindicated by the patient's condition: fall, closed head injury, scalp lac, cog impairment    2) Is the patient "bed confined" as defined below? No  To be "be confined" the patient must satisfy all three of the following conditions: (1) unable to get up from bed without Assistance; AND (2) unable to ambulate; AND (3) unable to sit in a chair or wheelchair  3) Can this patient safely be transported by car or wheelchair van (i e , seated during transport without a medical attendant or monitoring)?    No    4) In addition to completing questions 1-3 above, please check any of the following conditions that apply*:   *Note: supporting documentation for any boxes checked must be maintained in the patient's medical records  If hosp-hosp transfer, describe services needed at 2nd facility not available at 1st facility? Patient is confused  Moderate/severe pain on movement   Unable to tolerate seated position for time needed to transport       Section III - Signature of Physician or Healthcare Professional  I certify that the above information is true and correct based on my evaluation of this patient, and represent that the patient requires transport by ambulance and that other forms of transport are contraindicated  I understand that this information will be used by the Centers for Medicare and Medicaid Services (CMS) to support the determination of medical necessity for ambulance services, and I represent that I have personal knowledge of the patient's condition at time of transport  []  If this box is checked, I also certify that the patient is physically or mentally incapable of signing the ambulance service's claim and that the institution with which I am affiliated has furnished care, services, or assistance to the patient  My signature below is made on behalf of the patient pursuant to 42 CFR §424 36(b)(4)  In accordance with 42 CFR §424 37, the specific reason(s) that the patient is physically or mentally incapable of signing the claim form is as follows:      Signature of Physician* or Healthcare Professional______________________________________________________________  Signature Date 04/19/21 (For scheduled repetitive transports, this form is not valid for transports performed more than 60 days after this date)    Printed Name & Credentials of Physician or Healthcare Professional (MD, DO, RN, etc )_Jaleel Coreas _______________________________  *Form must be signed by patient's attending physician for scheduled, repetitive transports   For non-repetitive, unscheduled ambulance transports, if unable to obtain the signature of the attending physician, any of the following may sign (choose appropriate option below)  [] Physician Assistant []  Clinical Nurse Specialist []  Registered Nurse  []  Nurse Practitioner  [x] Discharge Planner

## 2021-04-19 NOTE — WOUND OSTOMY CARE
Consult Note - Wound   Lavon Johnson 80 y o  female MRN: [id number]  Unit/Bed#: [facility] 601-01 Encounter: [id number]      History and Present Illness:  Patient is seen for wound assessment  Patient examined while sitting in her chair and able to stand with moderate assistance  Patient is incontinent  Assessment Findings:   1  POA DTI on sacrum        Heels intact           See flowsheets for details    Skin care plans:  1-Hydraguard to bilateral sacrum, buttock and heels BID and PRN  2-Elevate heels to offload pressure  3-Ehob cushion in chair when out of bed  4-Moisturize skin daily with skin nourishing cream   5-Turn/reposition q2h or when medically stable for pressure re-distribution on skin  Call or tigertext with any questions  Wound Care will continue to follow    Wound 04/17/21 Pressure Injury Sacrum (Active)   Wound Image   04/19/21 0926   Wound Description Clean;Dry; Intact; Light purple;Non-blanchable erythema 04/19/21 0926   Pressure Injury Stage DTPI 04/19/21 0926   Radha-wound Assessment Clean;Dry; Intact; Purple 04/19/21 0926   Wound Length (cm) 6 cm 04/19/21 0926   Wound Width (cm) 4 cm 04/19/21 0926   Wound Surface Area (cm^2) 24 cm^2 04/19/21 0926   Drainage Amount None 04/18/21 0715   Treatments Cleansed;Site care 04/16/21 1944   Dressing Moisture barrier 04/19/21 0926   Patient Tolerance Tolerated well 04/17/21 0800

## 2021-04-19 NOTE — ASSESSMENT & PLAN NOTE
- Closed head injury with small extra-axial injury concerning for and traumatic brain injury/bleed following fall   - Neurosurgery evaluation and recommendations appreciated  - Hold home plavix for 2 wks following stable scan (4/17)  - Continue Keppra for 7 days for seizure prophylaxis currently day 4/7  - Repeat CTH 4/17: No significant intracranial hemorrhage is seen  - May continue Meclizine for dizziness  - Monitor neurologic exam   - Continue symptomatic management with analgesia as needed  - PT and OT recommend STR placement  - Outpatient Neurosurgery follow-up for closed head injury not necessary, follow up with current designated [facility] Neurosurgery office or [facility] [place] for known meningioma diagnosis   Family states that patient has stopped following with outpatient Nsx as she does not want surgery

## 2021-04-19 NOTE — PLAN OF CARE
Problem: Potential for Falls  Goal: Patient will remain free of falls  Description: INTERVENTIONS:  - Assess patient frequently for physical needs  -  Identify cognitive and physical deficits and behaviors that affect risk of falls    -  [place] fall precautions as indicated by assessment   - Educate patient/family on patient safety including physical limitations  - Instruct patient to call for assistance with activity based on assessment  - Modify environment to reduce risk of injury  - Consider OT/PT consult to assist with strengthening/mobility  Outcome: Progressing     Problem: Prexisting or High Potential for Compromised Skin Integrity  Goal: Skin integrity is maintained or improved  Description: INTERVENTIONS:  - Identify patients at risk for skin breakdown  - Assess and monitor skin integrity  - Assess and monitor nutrition and hydration status  - Monitor labs   - Assess for incontinence   - Turn and reposition patient  - Assist with mobility/ambulation  - Relieve pressure over bony prominences  - Avoid friction and shearing  - Provide appropriate hygiene as needed including keeping skin clean and dry  - Evaluate need for skin moisturizer/barrier cream  - Collaborate with interdisciplinary team   - Patient/family teaching  - Consider wound care consult   Outcome: Progressing     Problem: PAIN - ADULT  Goal: Verbalizes/displays adequate comfort level or baseline comfort level  Description: Interventions:  - Encourage patient to monitor pain and request assistance  - Assess pain using appropriate pain scale  - Administer analgesics based on type and severity of pain and evaluate response  - Implement non-pharmacological measures as appropriate and evaluate response  - Consider cultural and social influences on pain and pain management  - Notify physician/advanced practitioner if interventions unsuccessful or patient reports new pain  Outcome: Progressing     Problem: INFECTION - ADULT  Goal: Absence or prevention of progression during hospitalization  Description: INTERVENTIONS:  - Assess and monitor for signs and symptoms of infection  - Monitor lab/diagnostic results  - Monitor all insertion sites, i e  indwelling lines, tubes, and drains  - Monitor endotracheal if appropriate and nasal secretions for changes in amount and color  - [place] appropriate cooling/warming therapies per order  - Administer medications as ordered  - Instruct and encourage patient and family to use good hand hygiene technique  - Identify and instruct in appropriate isolation precautions for identified infection/condition  Outcome: Progressing  Goal: Absence of fever/infection during neutropenic period  Description: INTERVENTIONS:  - Monitor WBC    Outcome: Progressing     Problem: SAFETY ADULT  Goal: Patient will remain free of falls  Description: INTERVENTIONS:  - Assess patient frequently for physical needs  -  Identify cognitive and physical deficits and behaviors that affect risk of falls    -  [place] fall precautions as indicated by assessment   - Educate patient/family on patient safety including physical limitations  - Instruct patient to call for assistance with activity based on assessment  - Modify environment to reduce risk of injury  - Consider OT/PT consult to assist with strengthening/mobility  Outcome: Progressing  Goal: Maintain or return to baseline ADL function  Description: INTERVENTIONS:  -  Assess patient's ability to carry out ADLs; assess patient's baseline for ADL function and identify physical deficits which impact ability to perform ADLs (bathing, care of mouth/teeth, toileting, grooming, dressing, etc )  - Assess/evaluate cause of self-care deficits   - Assess range of motion  - Assess patient's mobility; develop plan if impaired  - Assess patient's need for assistive devices and provide as appropriate  - Encourage maximum independence but intervene and supervise when necessary  - Involve family in performance of ADLs  - Assess for home care needs following discharge   - Consider OT consult to assist with ADL evaluation and planning for discharge  - Provide patient education as appropriate  Outcome: Progressing  Goal: Maintain or return mobility status to optimal level  Description: INTERVENTIONS:  - Assess patient's baseline mobility status (ambulation, transfers, stairs, etc )    - Identify cognitive and physical deficits and behaviors that affect mobility  - Identify mobility aids required to assist with transfers and/or ambulation (gait belt, sit-to-stand, lift, walker, cane, etc )  - [place] fall precautions as indicated by assessment  - Record patient progress and toleration of activity level on Mobility SBAR; progress patient to next Phase/Stage  - Instruct patient to call for assistance with activity based on assessment  - Consider rehabilitation consult to assist with strengthening/weightbearing, etc   Outcome: Progressing

## 2021-04-19 NOTE — ASSESSMENT & PLAN NOTE
- Left posterior scalp laceration not requiring intervention     - Local wound care as indicated  - Analgesia as needed    - Tetanus vaccine updated

## 2021-04-19 NOTE — PLAN OF CARE
Problem: OCCUPATIONAL THERAPY ADULT  Goal: Performs self-care activities at highest level of function for planned discharge setting  See evaluation for individualized goals  Description: Treatment Interventions: ADL retraining, Functional transfer training, Endurance training, Cognitive reorientation, Patient/family training, Equipment evaluation/education, Energy conservation, Activityengagement          See flowsheet documentation for full assessment, interventions and recommendations  Outcome: Progressing  Note: Limitation: Decreased ADL status, Decreased Safe judgement during ADL, Decreased cognition, Decreased endurance, Decreased self-care trans, Decreased high-level ADLs  Prognosis: Fair, Guarded  Assessment: pt participated  in am ot session and was seen focusing on bed mobility, self feeding, adls, spt with asst x 2  pt was noted to tolerate sitting oob for several hours before needing to berepositioned for lunch in chair  pt was incont of bowel and was unaware of same  pt was physically cooperative but verbally frustrated with oob  pt required ta for bowel movement clean up  will folllw focusing on goals from ie  Recommendation: Geriatric Consult  OT Discharge Recommendation: Post acute rehabilitation services  OT - OK to Discharge:  Yes     DAVID Hercules

## 2023-05-05 NOTE — PLAN OF CARE
Problem: PHYSICAL THERAPY ADULT  Goal: Performs mobility at highest level of function for planned discharge setting  See evaluation for individualized goals  Description: Treatment/Interventions: Functional transfer training, LE strengthening/ROM, Elevations, Therapeutic exercise, Endurance training, Bed mobility, Gait training, Spoke to nursing, Spoke to case management, OT          See flowsheet documentation for full assessment, interventions and recommendations  Note: Prognosis: Good  Problem List: Decreased strength, Decreased endurance, Impaired balance, Decreased mobility, Decreased cognition, Impaired judgement, Decreased safety awareness, Pain  Assessment: Pt is a 80 y o  female seen for PT evaluation s/p admit to [facility] on 4/16/2021  Pt was admitted with a primary dx of: closed head injury s/p fall, + headstrike  Pt was initially admitted to [facility] where trauma workup was negative  However pt developed dizziness and vomited before d/c and was then transferred to [facility] for further workup  PT now consulted for assessment of mobility and d/c needs  Pt with Up in chair, active PT evaluation orders  Pts current comorbidities effecting treatment include: meningioma, HTN, DM, hypokalemia, and h/o CVA  Pts current clinical presentation is Unstable/ Unpredictable (high complexity) due to Ongoing medical management for primary dx, Decreased activity tolerance compared to baseline, Fall risk, Increased assistance needed from caregiver at current time, Cog status  Pt poor historian, unable to obtain PLOF or home setup, will need to clarify with CM  Per pt she "lives alone, but with many people" and ambulates with a rollator, reporting "many" falls  Upon evaluation, pt currently is requiring modA for bed mobility; [place] for transfers and modA for ambulation 30 ft w/ RW   Pt presents at PT eval functioning below baseline and currently w/ overall mobility deficits 2* to: BLE weakness, impaired balance, decreased endurance, gait deviations, pain, decreased activity tolerance compared to baseline, decreased functional mobility tolerance compared to baseline, fall risk, decreased cognition  Pt currently at a fall risk 2* to impairments listed above  Pt will continue to benefit from skilled acute PT interventions to address stated impairments; to maximize functional mobility; for ongoing pt/ family training; and DME needs  At conclusion of PT session pt returned back in chair and chair alarm engaged with phone and call bell within reach  Pt denies any further questions at this time  The patient's AM-PAC Basic Mobility Inpatient Short Form Raw Score is 14, Standardized Score is 35 55  A standardized score less than 42 9 suggests the patient may benefit from discharge to post-acute rehabilitation services  Please also refer to the recommendation of the Physical Therapist for safe discharge planning  Recommend rehab upon hospital D/C  PT Discharge Recommendation: Post acute rehabilitation services          See flowsheet documentation for full assessment  Patient seen at bedside, patient's aunt at bedside, friend on phone. Patient is AAOx3, complains of some changes in word finding, but at the complex conversation level, is fluent and without instances of anomia. Patient's speech is somewhat tangential and circumlocuitous.

## 2024-12-06 NOTE — DISCHARGE INSTR - OTHER ORDERS
>>>>> CALL patient's sister Devora to arrange 3 mo f/u (VIRTUAL OK) Skin care plans:  1-Hydraguard to bilateral sacrum, buttock and heels BID and PRN  2-Elevate heels to offload pressure  3-Eh[place] cushion in chair when out of bed  4-Moisturize skin daily with skin nourishing cream   5-Turn/reposition q2h or when medically stable for pressure re-distribution on skin